# Patient Record
Sex: FEMALE | Race: BLACK OR AFRICAN AMERICAN | URBAN - METROPOLITAN AREA
[De-identification: names, ages, dates, MRNs, and addresses within clinical notes are randomized per-mention and may not be internally consistent; named-entity substitution may affect disease eponyms.]

---

## 2017-06-02 ENCOUNTER — OFFICE VISIT (OUTPATIENT)
Dept: ENDOCRINOLOGY | Age: 57
End: 2017-06-02

## 2017-06-02 VITALS
WEIGHT: 271.1 LBS | OXYGEN SATURATION: 100 % | TEMPERATURE: 98.2 F | DIASTOLIC BLOOD PRESSURE: 76 MMHG | BODY MASS INDEX: 48.04 KG/M2 | SYSTOLIC BLOOD PRESSURE: 148 MMHG | RESPIRATION RATE: 20 BRPM | HEART RATE: 107 BPM | HEIGHT: 63 IN

## 2017-06-02 DIAGNOSIS — Z79.4 UNCONTROLLED TYPE 2 DIABETES MELLITUS WITH HYPERGLYCEMIA, WITH LONG-TERM CURRENT USE OF INSULIN (HCC): ICD-10-CM

## 2017-06-02 DIAGNOSIS — E11.65 UNCONTROLLED TYPE 2 DIABETES MELLITUS WITH HYPERGLYCEMIA, WITH LONG-TERM CURRENT USE OF INSULIN (HCC): ICD-10-CM

## 2017-06-02 DIAGNOSIS — Z79.4 TYPE 2 DIABETES MELLITUS WITH HYPERGLYCEMIA, WITH LONG-TERM CURRENT USE OF INSULIN (HCC): Primary | ICD-10-CM

## 2017-06-02 DIAGNOSIS — I10 ESSENTIAL HYPERTENSION WITH GOAL BLOOD PRESSURE LESS THAN 140/90: ICD-10-CM

## 2017-06-02 DIAGNOSIS — E78.2 MIXED HYPERLIPIDEMIA: ICD-10-CM

## 2017-06-02 DIAGNOSIS — E11.65 TYPE 2 DIABETES MELLITUS WITH HYPERGLYCEMIA, WITH LONG-TERM CURRENT USE OF INSULIN (HCC): Primary | ICD-10-CM

## 2017-06-02 LAB
GLUCOSE POC: 147 MG/DL
HBA1C MFR BLD HPLC: 9.7 %

## 2017-06-02 RX ORDER — INSULIN GLARGINE 100 [IU]/ML
INJECTION, SOLUTION SUBCUTANEOUS
Qty: 30 ML | Refills: 5 | Status: SHIPPED | OUTPATIENT
Start: 2017-06-02 | End: 2017-07-19 | Stop reason: SDUPTHER

## 2017-06-02 RX ORDER — METFORMIN HYDROCHLORIDE 1000 MG/1
1000 TABLET ORAL 2 TIMES DAILY WITH MEALS
Qty: 60 TAB | Refills: 11 | Status: SHIPPED | OUTPATIENT
Start: 2017-06-02

## 2017-06-02 RX ORDER — PEN NEEDLE, DIABETIC 31 GX3/16"
NEEDLE, DISPOSABLE MISCELLANEOUS
Qty: 100 PEN NEEDLE | Refills: 11 | Status: SHIPPED | OUTPATIENT
Start: 2017-06-02 | End: 2018-10-11 | Stop reason: SDUPTHER

## 2017-06-02 NOTE — PATIENT INSTRUCTIONS
Basaglar 75 units at bedtime    Metformin 1000 mg twice day     Trulicity 1.5 mg weekly         Blood sugar  Breakfast/Lunch/Dinner         150-200  Add  4  Units       201-250  Add  8 Units       251-300  Add  12 Units       301-350  Add 16  Units        351-400  Add 20  Units

## 2017-06-02 NOTE — PROGRESS NOTES
[facility] ENDOCRINOLOGY               Shana Yousif MD        [address] [address] FO:[phone] Fax [phone]               Patient Information  Date:6/2/2017  Name : Stephanie Monsivais 49 y.o.     YOB: 1960                 Chief Complaint   Patient presents with    Diabetes       History of Present Illness: Stephanie Monsivais is a 49 y.o. female here for followupof  Type 2 Diabetes Mellitus. Type 2 Diabetes was diagnosed 24 years  . End organ effects of diabetes: peripheral neuropathy. 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 2 months             Wt Readings from Last 3 Encounters:   06/02/17 271 lb 1.6 oz (123 kg)   09/26/16 262 lb 12.8 oz (119.2 kg)       BP Readings from Last 3 Encounters:   06/02/17 148/76   09/26/16 140/73           Past Medical History:   Diagnosis Date    Diabetes mellitus, type II ([facility] [address] 1993    Hyperlipidemia     Hypertension      Current Outpatient Prescriptions   Medication Sig    atorvastatin (LIPITOR) 20 mg tablet TAKE 1 TABLET BY MOUTH AT BEDTIME    chlorthalidone (HYGROTEN) 25 mg tablet TAKE 1 TABLET BY MOUTH EVERY DAY    diltiazem CD (CARDIZEM CD) 300 mg ER capsule TAKE 1 CAP BY MOUTH ONCE A DAY.  fenofibrate nanocrystallized (TRICOR) 145 mg tablet TAKE 1 TABET BY MOUTH EVERY DAY    NOVOLOG 100 unit/mL injection INJECT 8 UNITS BENEATH THE SKIN 3 TIMES DAILY BEFORE MEALS.  LANTUS 100 unit/mL injection INJECT 60 UNITS SUBCUTANEOUSLY AT BEDTIME    losartan (COZAAR) 100 mg tablet TAKE 1 TABLET BY MOUTH EVERY DAY    ASPIRIN/SALICYLAMIDE/CAFFEINE (BC HEADACHE POWDER PO) Take  by mouth.  dulaglutide (TRULICITY) 5.58 SS/7.0 mL sub-q pen 0.5 mL by SubCUTAneous route every seven (7) days.      No current facility-administered medications for this visit. Allergies   Allergen Reactions    Lisinopril Cough     \"lungs shut down\"         Review of Systems:  -   - Gastrointestinal: no dysphagia no  abdominal pain  - Musculoskeletal: no joint pains +  weakness  - Integumentary: no rashes  - Neurological: + numbness, tingling, no  headaches  - Psychiatric: no depression no  anxiety  - Endocrine: no heat or cold intolerance    Physical Examination:   Blood pressure 148/76, pulse (!) 107, temperature 98.2 °F (36.8 °C), temperature source Oral, resp. rate 20, height 5' 3\" (1.6 m), weight 271 lb 1.6 oz (123 kg), SpO2 100 %. Estimated body mass index is 48.02 kg/(m^2) as calculated from the following:    Height as of this encounter: 5' 3\" (1.6 m). -   Weight as of this encounter: 271 lb 1.6 oz (123 kg). - General: pleasant, no distress, good eye contact  - HEENT: no pallor, no periorbital edema, EOMI  - Neck: supple, no thyromegaly, no nodules  - Cardiovascular: regular, normal rate, normal S1 and S2, no murmurs  - Respiratory: clear to auscultation bilaterally  - Gastrointestinal: soft, nontender, nondistended,  BS +  - Musculoskeletal: no proximal muscle weakness in upper or lower extremities  - Integumentary: no acanthosis nigricans,no edema,   - Neurological: alert and oriented  - Psychiatric: normal mood and affect  - Skin: color, texture, turgor normal.       Data Reviewed:     [] Glucose records reviewed. [] See glucose records for details (to be scanned). [] A1C  [] Reviewed labs    no labs    Assessment/Plan:     1. Type 2 diabetes mellitus with hyperglycemia, with long-term current use of insulin ([address]    2. Essential hypertension with goal blood pressure less than 140/90    3. Mixed hyperlipidemia        1.  Type 2 Diabetes Mellitus with neuropathy,  Lab Results   Component Value Date/Time    Hemoglobin A1c (POC) 9.7 06/02/2017 37:42 AM     Trulicity 1.5 mg weekly   Continue lantus   Metformin   Advised to check glucose 2 - 4 times daily  [place] [employer] patient's can use the savings card, will discuss with pharmacy . 2. HTN : Continue current therapy     3. Hyperlipidemia : Continue statin. 4.Obesity:Body mass index is 48.02 kg/(m^2). Discussed about the importance of exercise and carbohydrate portion control. There are no Patient Instructions on file for this visit. Follow-up Disposition: Not on File    Thank you for allowing me to participate in the care of this patient.     Jenny Aguirre MD      Patient verbalized understanding

## 2017-06-02 NOTE — PROGRESS NOTES
Chris Kumar is a 49 y.o. female here for   Chief Complaint   Patient presents with    Diabetes       Functional glucose monitor and record keeping system? - not regularly  Eye exam within last year? - yes, June 2017  Foot exam within last year? - yes, 5-6 mo ago    Lab Results   Component Value Date/Time    Hemoglobin A1c (POC) 10.1 09/26/2016 10:01 AM       Wt Readings from Last 3 Encounters:   09/26/16 262 lb 12.8 oz (119.2 kg)     Temp Readings from Last 3 Encounters:   09/26/16 96.3 °F (35.7 °C) (Oral)     BP Readings from Last 3 Encounters:   09/26/16 140/73     Pulse Readings from Last 3 Encounters:   09/26/16 90

## 2017-06-02 NOTE — MR AVS SNAPSHOT
Visit Information Date & Time Provider Department Dept. Phone Encounter #  
 6/2/2017 10:30 AM Yelitza Mayo MD [facility] [phone] [id number] Follow-up Instructions Return in about 3 months (around 9/2/2017). Upcoming Health Maintenance Date Due Hepatitis C Screening 1960 LIPID PANEL Q1 1960 FOOT EXAM Q1 7/10/1972 EYE EXAM RETINAL OR DILATED Q1 7/10/1972 Pneumococcal 19-64 Medium Risk (1 of 1 - PPSV23) 7/10/1981 DTaP/Tdap/Td series (1 - Tdap) 7/10/1983 PAP AKA CERVICAL CYTOLOGY 7/10/1983 BREAST CANCER SCRN MAMMOGRAM 7/10/2012 FOBT Q 1 YEAR AGE 50-75 7/10/2012 HEMOGLOBIN A1C Q6M 3/26/2017 INFLUENZA AGE 9 TO ADULT 8/1/2017 MICROALBUMIN Q1 9/26/2017 Allergies as of 6/2/2017  Review Complete On: 6/2/2017 By: Yelitza Mayo MD  
  
 Severity Noted Reaction Type Reactions Lisinopril  09/26/2016    Cough \"lungs shut down\" Current Immunizations  Never Reviewed No immunizations on file. Not reviewed this visit You Were Diagnosed With   
  
 Codes Comments Type 2 diabetes mellitus with hyperglycemia, with long-term current use of insulin (HCC)    -  Primary ICD-10-CM: E11.65, Z79.4 ICD-9-CM: 250.00, 790.29, V58.67 Essential hypertension with goal blood pressure less than 140/90     ICD-10-CM: I10 
ICD-9-CM: 401.9 Mixed hyperlipidemia     ICD-10-CM: E78.2 ICD-9-CM: 272.2 Vitals BP Pulse Temp Resp Height(growth percentile) Weight(growth percentile) 148/76 (BP 1 Location: Left arm, BP Patient Position: Sitting) (!) 107 98.2 °F (36.8 °C) (Oral) 20 5' 3\" (1.6 m) 271 lb 1.6 oz (123 kg) SpO2 BMI OB Status Smoking Status 100% 48.02 kg/m2 Menopause Former Smoker Vitals History BMI and BSA Data Body Mass Index Body Surface Area 48.02 kg/m 2 2.34 m 2 Preferred Pharmacy Pharmacy Name Phone [id number] - [place] [address] [place] [address] [phone] Your Updated Medication List  
  
   
This list is accurate as of: 6/2/17 11:03 AM.  Always use your most recent med list.  
  
  
  
  
 atorvastatin 20 mg tablet Commonly known as:  LIPITOR  
TAKE 1 TABLET BY MOUTH AT BEDTIME  
  
 BC HEADACHE POWDER PO Take  by mouth. chlorthalidone 25 mg tablet Commonly known as:  HYGROTEN  
TAKE 1 TABLET BY MOUTH EVERY DAY  
  
 dilTIAZem  mg ER capsule Commonly known as:  CARDIZEM CD  
TAKE 1 CAP BY MOUTH ONCE A DAY. dulaglutide 0.75 mg/0.5 mL sub-q pen Commonly known as:  TRULICITY  
0.5 mL by SubCUTAneous route every seven (7) days. fenofibrate nanocrystallized 145 mg tablet Commonly known as:  TRICOR  
TAKE 1 TABET BY MOUTH EVERY DAY  
  
 LANTUS 100 unit/mL injection Generic drug:  insulin glargine INJECT 60 UNITS SUBCUTANEOUSLY AT BEDTIME  
  
 losartan 100 mg tablet Commonly known as:  COZAAR  
TAKE 1 TABLET BY MOUTH EVERY DAY NovoLOG 100 unit/mL injection Generic drug:  insulin aspart INJECT 8 UNITS BENEATH THE SKIN 3 TIMES DAILY BEFORE MEALS. We Performed the Following AMB POC GLUCOSE, QUANTITATIVE, BLOOD [32112 CPT(R)] AMB POC HEMOGLOBIN A1C [18302 CPT(R)] Follow-up Instructions Return in about 3 months (around 9/2/2017). Patient Instructions Basaglar 75 units at bedtime Metformin 1000 mg twice day Trulicity 1.5 mg weekly Blood sugar  Breakfast/Lunch/Dinner 150-200  Add  4  Units 201-250  Add  8 Units 251-300  Add  12 Units 301-350  Add 16  Units 351-400  Add 20  Units Introducing [facility] & HEALTH SERVICES! Mindy Doe introduces [employer] patient portal. Now you can access parts of your medical record, email your doctor's office, and request medication refills online. 1. In your internet browser, go to [email or web address] 2. Click on the First Time User? Click Here link in the Sign In box. You will see the New Member Sign Up page. 3. Enter your [employer] Access Code exactly as it appears below. You will not need to use this code after youve completed the sign-up process. If you do not sign up before the expiration date, you must request a new code. · [employer] Access Code: [email or web address] Expires: 8/31/2017 11:03 AM 
 
4. Enter the last four digits of your Social Security Number (xxxx) and Date of Birth (mm/dd/yyyy) as indicated and click Submit. You will be taken to the next sign-up page. 5. Create a [employer] ID. This will be your [employer] login ID and cannot be changed, so think of one that is secure and easy to remember. 6. Create a [employer] password. You can change your password at any time. 7. Enter your Password Reset Question and Answer. This can be used at a later time if you forget your password. 8. Enter your e-mail address. You will receive e-mail notification when new information is available in [address] 9. Click Sign Up. You can now view and download portions of your medical record. 10. Click the Download Summary menu link to download a portable copy of your medical information. If you have questions, please visit the Frequently Asked Questions section of the [employer] website. Remember, [employer] is NOT to be used for urgent needs. For medical emergencies, dial 911. Now available from your iPhone and Android! Please provide this summary of care documentation to your next provider. If you have any questions after today's visit, please call [phone].

## 2017-07-19 DIAGNOSIS — E11.65 UNCONTROLLED TYPE 2 DIABETES MELLITUS WITH HYPERGLYCEMIA, WITH LONG-TERM CURRENT USE OF INSULIN (HCC): ICD-10-CM

## 2017-07-19 DIAGNOSIS — Z79.4 TYPE 2 DIABETES MELLITUS WITH HYPERGLYCEMIA, WITH LONG-TERM CURRENT USE OF INSULIN (HCC): Primary | ICD-10-CM

## 2017-07-19 DIAGNOSIS — Z79.4 UNCONTROLLED TYPE 2 DIABETES MELLITUS WITH HYPERGLYCEMIA, WITH LONG-TERM CURRENT USE OF INSULIN (HCC): ICD-10-CM

## 2017-07-19 DIAGNOSIS — E11.65 TYPE 2 DIABETES MELLITUS WITH HYPERGLYCEMIA, WITH LONG-TERM CURRENT USE OF INSULIN (HCC): Primary | ICD-10-CM

## 2017-07-19 RX ORDER — INSULIN ASPART 100 [IU]/ML
INJECTION, SOLUTION INTRAVENOUS; SUBCUTANEOUS
Qty: 20 ML | Refills: 11 | Status: SHIPPED | OUTPATIENT
Start: 2017-07-19 | End: 2018-03-12 | Stop reason: SDUPTHER

## 2017-07-19 RX ORDER — INSULIN GLARGINE 100 [IU]/ML
INJECTION, SOLUTION SUBCUTANEOUS
Qty: 30 ML | Refills: 11 | Status: SHIPPED | OUTPATIENT
Start: 2017-07-19 | End: 2018-11-16 | Stop reason: SDUPTHER

## 2018-03-12 ENCOUNTER — OFFICE VISIT (OUTPATIENT)
Dept: ENDOCRINOLOGY | Age: 58
End: 2018-03-12

## 2018-03-12 VITALS
TEMPERATURE: 98 F | HEART RATE: 101 BPM | WEIGHT: 268 LBS | RESPIRATION RATE: 18 BRPM | BODY MASS INDEX: 47.48 KG/M2 | HEIGHT: 63 IN | OXYGEN SATURATION: 97 % | DIASTOLIC BLOOD PRESSURE: 71 MMHG | SYSTOLIC BLOOD PRESSURE: 144 MMHG

## 2018-03-12 DIAGNOSIS — E78.2 MIXED HYPERLIPIDEMIA: ICD-10-CM

## 2018-03-12 DIAGNOSIS — Z79.4 TYPE 2 DIABETES MELLITUS WITH HYPERGLYCEMIA, WITH LONG-TERM CURRENT USE OF INSULIN (HCC): ICD-10-CM

## 2018-03-12 DIAGNOSIS — Z79.4 DIABETES MELLITUS DUE TO UNDERLYING CONDITION WITH DIABETIC NEUROPATHY, WITH LONG-TERM CURRENT USE OF INSULIN (HCC): ICD-10-CM

## 2018-03-12 DIAGNOSIS — E08.40 DIABETES MELLITUS DUE TO UNDERLYING CONDITION WITH DIABETIC NEUROPATHY, WITH LONG-TERM CURRENT USE OF INSULIN (HCC): ICD-10-CM

## 2018-03-12 DIAGNOSIS — E11.65 TYPE 2 DIABETES MELLITUS WITH HYPERGLYCEMIA, WITH LONG-TERM CURRENT USE OF INSULIN (HCC): ICD-10-CM

## 2018-03-12 DIAGNOSIS — E66.01 OBESITY, MORBID (HCC): ICD-10-CM

## 2018-03-12 DIAGNOSIS — Z79.4 TYPE 2 DIABETES MELLITUS WITH HYPERGLYCEMIA, WITH LONG-TERM CURRENT USE OF INSULIN (HCC): Primary | ICD-10-CM

## 2018-03-12 DIAGNOSIS — Z79.4 UNCONTROLLED TYPE 2 DIABETES MELLITUS WITH HYPERGLYCEMIA, WITH LONG-TERM CURRENT USE OF INSULIN (HCC): ICD-10-CM

## 2018-03-12 DIAGNOSIS — I10 ESSENTIAL HYPERTENSION WITH GOAL BLOOD PRESSURE LESS THAN 140/90: ICD-10-CM

## 2018-03-12 DIAGNOSIS — E11.65 UNCONTROLLED TYPE 2 DIABETES MELLITUS WITH HYPERGLYCEMIA, WITH LONG-TERM CURRENT USE OF INSULIN (HCC): ICD-10-CM

## 2018-03-12 DIAGNOSIS — E11.65 TYPE 2 DIABETES MELLITUS WITH HYPERGLYCEMIA, WITH LONG-TERM CURRENT USE OF INSULIN (HCC): Primary | ICD-10-CM

## 2018-03-12 LAB
GLUCOSE POC: 98 MG/DL
HBA1C MFR BLD HPLC: 10.4 %

## 2018-03-12 RX ORDER — LANCETS
EACH MISCELLANEOUS
Qty: 100 EACH | Refills: 11 | Status: SHIPPED | OUTPATIENT
Start: 2018-03-12 | End: 2021-03-10 | Stop reason: SDUPTHER

## 2018-03-12 RX ORDER — INSULIN ASPART 100 [IU]/ML
INJECTION, SOLUTION INTRAVENOUS; SUBCUTANEOUS
Qty: 30 ML | Refills: 11 | Status: SHIPPED | OUTPATIENT
Start: 2018-03-12 | End: 2019-04-04 | Stop reason: SDUPTHER

## 2018-03-12 RX ORDER — BLOOD-GLUCOSE METER
EACH MISCELLANEOUS
Qty: 1 EACH | Refills: 0 | Status: SHIPPED | OUTPATIENT
Start: 2018-03-12 | End: 2018-04-11 | Stop reason: SDUPTHER

## 2018-03-12 NOTE — MR AVS SNAPSHOT
[address] [facility] [address] 
[phone] Patient: Narinder Side MRN: [id number] K:4[birth year]49/8551 Visit Information Date & Time Provider Department Dept. Phone Encounter #  
 3/12/2018 10:30 AM Suzy Tinoco MD [facility] [phone] [id number] Follow-up Instructions Return in about 4 months (around 7/12/2018). Upcoming Health Maintenance Date Due Hepatitis C Screening 1960 LIPID PANEL Q1 1960 FOOT EXAM Q1 7/10/1972 EYE EXAM RETINAL OR DILATED Q1 7/10/1972 Pneumococcal 19-64 Medium Risk (1 of 1 - PPSV23) 7/10/1981 DTaP/Tdap/Td series (1 - Tdap) 7/10/1983 PAP AKA CERVICAL CYTOLOGY 7/10/1983 BREAST CANCER SCRN MAMMOGRAM 7/10/2012 FOBT Q 1 YEAR AGE 50-75 7/10/2012 Influenza Age 5 to Adult 8/1/2017 MICROALBUMIN Q1 9/26/2017 HEMOGLOBIN A1C Q6M 12/2/2017 Allergies as of 3/12/2018  Review Complete On: 3/12/2018 By: Suzy Tinoco MD  
  
 Severity Noted Reaction Type Reactions Lisinopril  09/26/2016    Cough \"lungs shut down\" Current Immunizations  Never Reviewed No immunizations on file. Not reviewed this visit You Were Diagnosed With   
  
 Codes Comments Type 2 diabetes mellitus with hyperglycemia, with long-term current use of insulin (HCC)    -  Primary ICD-10-CM: E11.65, Z79.4 ICD-9-CM: 250.00, 790.29, V58.67 Essential hypertension with goal blood pressure less than 140/90     ICD-10-CM: I10 
ICD-9-CM: 401.9 Mixed hyperlipidemia     ICD-10-CM: E78.2 ICD-9-CM: 272.2 Vitals BP Pulse Temp Resp Height(growth percentile) Weight(growth percentile) 144/71 (BP 1 Location: Right arm, BP Patient Position: Sitting) (!) 101 98 °F (36.7 °C) (Oral) 18 5' 3\" (1.6 m) 268 lb (121.6 kg) SpO2 BMI OB Status Smoking Status 97% 47.47 kg/m2 Menopause Former Smoker Vitals History BMI and BSA Data Body Mass Index Body Surface Area  
 47.47 kg/m 2 2.32 m 2 Preferred Pharmacy Pharmacy Name Phone [facility][id number] [facility] [place] - [address] [place] [address] [phone] Your Updated Medication List  
  
   
This list is accurate as of 3/12/18 11:08 AM.  Always use your most recent med list.  
  
  
  
  
 atorvastatin 20 mg tablet Commonly known as:  LIPITOR  
TAKE 1 TABLET BY MOUTH AT BEDTIME  
  
 BC HEADACHE POWDER PO Take  by mouth. chlorthalidone 25 mg tablet Commonly known as:  HYGROTEN  
TAKE 1 TABLET BY MOUTH EVERY DAY  
  
 dilTIAZem  mg ER capsule Commonly known as:  CARDIZEM CD  
TAKE 1 CAP BY MOUTH ONCE A DAY. dulaglutide 1.5 mg/0.5 mL sub-q pen Commonly known as:  TRULICITY  
0.5 mL by SubCUTAneous route every seven (7) days. fenofibrate nanocrystallized 145 mg tablet Commonly known as:  TRICOR  
TAKE 1 TABET BY MOUTH EVERY DAY  
  
 insulin glargine 100 unit/mL (3 mL) Inpn Commonly known as:  BASAGLAR KWIKPEN U-100 INSULIN Inject 75 units at bedtime Insulin Needles (Disposable) 32 gauge x 5/32\" Ndle Commonly known as:  Betzaida Pen Needle Use to inject Basaglar daily Dx Code: E11.65  
  
 losartan 100 mg tablet Commonly known as:  COZAAR  
TAKE 1 TABLET BY MOUTH EVERY DAY  
  
 metFORMIN 1,000 mg tablet Commonly known as:  GLUCOPHAGE Take 1 Tab by mouth two (2) times daily (with meals). NovoLOG U-100 Insulin aspart 100 unit/mL injection Generic drug:  insulin aspart U-100 Use with SSI. Max units daily: 60 We Performed the Following AMB POC GLUCOSE, QUANTITATIVE, BLOOD [74240 CPT(R)] AMB POC HEMOGLOBIN A1C [09913 CPT(R)] LDL, DIRECT [id number] CPT(R)] METABOLIC PANEL, COMPREHENSIVE [76484 CPT(R)] MICROALBUMIN, UR, RAND W/ MICROALB/CREAT RATIO [id number] CPT(R)] Follow-up Instructions Return in about 4 months (around 7/12/2018). Patient Instructions Basaglar 75 units at bedtime Metformin 1000 mg twice day Trulicity 1.5 mg weekly If you can not get Trulicity then you need Novolog 18 units before each meal  
 
Additional Novolog for high sugars Blood sugar  Breakfast/Lunch/Dinner 150-200  Add  4  Units 201-250  Add  8 Units 251-300  Add  12 Units 301-350  Add 16  Units 351-400  Add 20  Units Introducing [facility]! [employer] introduces [employer] patient portal. Now you can access parts of your medical record, email your doctor's office, and request medication refills online. 1. In your internet browser, go to [email or web address]. [employer]oon/[employer] 2. Click on the First Time User? Click Here link in the Sign In box. You will see the New Member Sign Up page. 3. Enter your [employer] Access Code exactly as it appears below. You will not need to use this code after youve completed the sign-up process. If you do not sign up before the expiration date, you must request a new code. · [employer] Access Code: [email or web address] Expires: 6/10/2018 11:03 AM 
 
4. Enter the last four digits of your Social Security Number (xxxx) and Date of Birth (mm/dd/yyyy) as indicated and click Submit. You will be taken to the next sign-up page. 5. Create a [employer] ID. This will be your [employer] login ID and cannot be changed, so think of one that is secure and easy to remember. 6. Create a [employer] password. You can change your password at any time. 7. Enter your Password Reset Question and Answer. This can be used at a later time if you forget your password. 8. Enter your e-mail address. You will receive e-mail notification when new information is available in [address] 9. Click Sign Up. You can now view and download portions of your medical record. 10. Click the Download Summary menu link to download a portable copy of your medical information. If you have questions, please visit the Frequently Asked Questions section of the [employer] website. Remember, [employer] is NOT to be used for urgent needs. For medical emergencies, dial 911. Now available from your iPhone and Android! Please provide this summary of care documentation to your next provider. If you have any questions after today's visit, please call [phone].

## 2018-03-12 NOTE — PROGRESS NOTES
[facility]               Deven Lyon MD        [address] 78 [phone] Fax [phone]               Patient Information  Date:3/12/2018  Name : Oly Harvey 56 y.o.     YOB: 1960                 Chief Complaint   Patient presents with    Diabetes       History of Present Illness: Oly Harvey is a 56 y.o. female here for followupof  Type 2 Diabetes Mellitus. Type 2 Diabetes was diagnosed 24 years  . End organ effects of diabetes: peripheral neuropathy. 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 A1c is elevated  No chest pain or shortness of breath. Trulicity helped her and wishes to go back on it          Wt Readings from Last 3 Encounters:   03/12/18 268 lb (121.6 kg)   06/02/17 271 lb 1.6 oz (123 kg)   09/26/16 262 lb 12.8 oz (119.2 kg)       BP Readings from Last 3 Encounters:   03/12/18 144/71   06/02/17 148/76   09/26/16 140/73           Past Medical History:   Diagnosis Date    Diabetes mellitus, type II ([facility] [address] 1993    Hyperlipidemia     Hypertension      Current Outpatient Prescriptions   Medication Sig    insulin glargine (BASAGLAR KWIKPEN) 100 unit/mL (3 mL) inpn Inject 75 units at bedtime    metFORMIN (GLUCOPHAGE) 1,000 mg tablet Take 1 Tab by mouth two (2) times daily (with meals).  Insulin Needles, Disposable, (ITZEL PEN NEEDLE) 32 gauge x 5/32\" ndle Use to inject Basaglar daily Dx Code: E11.65    atorvastatin (LIPITOR) 20 mg tablet TAKE 1 TABLET BY MOUTH AT BEDTIME    chlorthalidone (HYGROTEN) 25 mg tablet TAKE 1 TABLET BY MOUTH EVERY DAY    diltiazem CD (CARDIZEM CD) 300 mg ER capsule TAKE 1 CAP BY MOUTH ONCE A DAY.     fenofibrate nanocrystallized (TRICOR) 145 mg tablet TAKE 1 TABET BY MOUTH EVERY DAY    losartan (COZAAR) 100 mg tablet TAKE 1 TABLET BY MOUTH EVERY DAY    ASPIRIN/SALICYLAMIDE/CAFFEINE (BC HEADACHE POWDER PO) Take  by mouth.  NOVOLOG U-100 INSULIN ASPART 100 unit/mL injection 18 units before each meal w/ SSI Max units daily: 60    dulaglutide (TRULICITY) 1.5 FJ/7.6 mL sub-q pen 0.5 mL by SubCUTAneous route every seven (7) days.  Blood-Glucose Meter (ACCU-CHEK CARLOS PLUS METER) misc Test TID Dx Code: E11.65    glucose blood VI test strips (ACCU-CHEK CARLOS PLUS TEST STRP) strip Test TID Dx Code: E11.65    Lancets (ACCU-CHEK FASTCLIX) misc Test TID Dx Code: E11.65     No current facility-administered medications for this visit. Allergies   Allergen Reactions    Lisinopril Cough     \"lungs shut down\"         Review of Systems:  -   - Gastrointestinal: no dysphagia no  abdominal pain  - Musculoskeletal: no joint pains +  weakness  - Integumentary: no rashes  - Neurological: + numbness, tingling, no  headaches  - Psychiatric: no depression no  anxiety  - Endocrine: no heat or cold intolerance    Physical Examination:   Blood pressure 144/71, pulse (!) 101, temperature 98 °F (36.7 °C), temperature source Oral, resp. rate 18, height 5' 3\" (1.6 m), weight 268 lb (121.6 kg), SpO2 97 %. Estimated body mass index is 47.47 kg/(m^2) as calculated from the following:    Height as of this encounter: 5' 3\" (1.6 m). -   Weight as of this encounter: 268 lb (121.6 kg).   - General: pleasant, no distress, good eye contact  - HEENT: no pallor, no periorbital edema, EOMI  - Neck: supple, no thyromegaly, no nodules  - Cardiovascular: regular, normal rate, normal S1 and S2, no murmurs  - Respiratory: clear to auscultation bilaterally  - Gastrointestinal: soft, nontender, nondistended,  BS +  - Musculoskeletal: no proximal muscle weakness in upper or lower extremities  - Integumentary: no edema,   - Neurological: alert and oriented  - Psychiatric: normal mood and affect  - Skin: color, texture, turgor normal.     Diabetic foot exam: March 2018    Left:     Vibratory sensation absent   Filament test decreased sensation with micro filament   Pulse DP: 1+    Deformities: None  Right:    Vibratory sensation absent   Filament test decreased sensation with micro filament   Pulse DP: 1+   Deformities: None    Data Reviewed:     [] Glucose records reviewed. [] See glucose records for details (to be scanned). [] A1C  [] Reviewed labs    no labs    Assessment/Plan:     1. Type 2 diabetes mellitus with hyperglycemia, with long-term current use of insulin ([address]    2. Essential hypertension with goal blood pressure less than 140/90    3. Mixed hyperlipidemia    4. Obesity, morbid ([address]    5. Diabetes mellitus due to underlying condition with diabetic neuropathy, with long-term current use of insulin ([address]        1. Type 2 Diabetes Mellitus with neuropathy,  Lab Results   Component Value Date/Time    Hemoglobin A1c (POC) 10.4 03/12/2018 10:43 AM   Uncontrolled, counseled about the diet  Trulicity 1.5 mg weekly -if she cannot afford Trulicity discussed about prandial insulin  Continue lantus   Metformin   Advised to check glucose 2 - 4 times daily  [employer] patient's can use the [employer] card, will discuss with pharmacy . 2. HTN : Continue current therapy     3. Hyperlipidemia : Continue statin. 4.Obesity:Body mass index is 47.47 kg/(m^2). Discussed about the importance of exercise and carbohydrate portion control. Patient Instructions   Basaglar 75 units at bedtime    Metformin 1000 mg twice day     Trulicity 1.5 mg weekly     If you can not get Trulicity then you need Novolog 18 units before each meal     Additional Novolog for high sugars     Blood sugar  Breakfast/Lunch/Dinner         150-200  Add  4  Units       201-250  Add  8 Units       251-300  Add  12 Units       301-350  Add 16  Units        351-400  Add 20  Units     Follow-up Disposition:  Return in about 4 months (around 7/12/2018). Thank you for allowing me to participate in the care of this patient.     Martha Garcia Kim Bhagat MD      Patient verbalized understanding

## 2018-03-12 NOTE — PATIENT INSTRUCTIONS
Basaglar 75 units at bedtime    Metformin 1000 mg twice day     Trulicity 1.5 mg weekly     If you can not get Trulicity then you need Novolog 18 units before each meal     Additional Novolog for high sugars     Blood sugar  Breakfast/Lunch/Dinner         150-200  Add  4  Units       201-250  Add  8 Units       251-300  Add  12 Units       301-350  Add 16  Units        351-400  Add 20  Units

## 2018-03-12 NOTE — PROGRESS NOTES
Nicole Justice is a 56 y.o. female here for   Chief Complaint   Patient presents with    Diabetes       Functional glucose monitor and record keeping system? - yes  Eye exam within last year? - Jan 2018 [address] exam within last year? - due    1. Have you been to the ER, urgent care clinic since your last visit? Hospitalized since your last visit? -no    2. Have you seen or consulted any other health care providers outside of the [address] [place] [address] since your last visit? Include any pap smears or colon screening. -PCP      Lab Results   Component Value Date/Time    Hemoglobin A1c (POC) 9.7 06/02/2017 10:29 AM       Wt Readings from Last 3 Encounters:   06/02/17 271 lb 1.6 oz (123 kg)   09/26/16 262 lb 12.8 oz (119.2 kg)     Temp Readings from Last 3 Encounters:   06/02/17 98.2 °F (36.8 °C) (Oral)   09/26/16 96.3 °F (35.7 °C) (Oral)     BP Readings from Last 3 Encounters:   06/02/17 148/76   09/26/16 140/73     Pulse Readings from Last 3 Encounters:   06/02/17 (!) 107   09/26/16 90

## 2018-03-13 LAB
ALBUMIN SERPL-MCNC: 3.9 G/DL (ref 3.5–5.5)
ALBUMIN/CREAT UR: 2743.8 MG/G CREAT (ref 0–30)
ALBUMIN/GLOB SERPL: 1.3 {RATIO} (ref 1.2–2.2)
ALP SERPL-CCNC: 131 IU/L (ref 39–117)
ALT SERPL-CCNC: 17 IU/L (ref 0–32)
AST SERPL-CCNC: 18 IU/L (ref 0–40)
BILIRUB SERPL-MCNC: <0.2 MG/DL (ref 0–1.2)
BUN SERPL-MCNC: 30 MG/DL (ref 6–24)
BUN/CREAT SERPL: 24 (ref 9–23)
CALCIUM SERPL-MCNC: 9.4 MG/DL (ref 8.7–10.2)
CHLORIDE SERPL-SCNC: 102 MMOL/L (ref 96–106)
CO2 SERPL-SCNC: 22 MMOL/L (ref 18–29)
CREAT SERPL-MCNC: 1.23 MG/DL (ref 0.57–1)
CREAT UR-MCNC: 78 MG/DL
GFR SERPLBLD CREATININE-BSD FMLA CKD-EPI: 49 ML/MIN/1.73
GFR SERPLBLD CREATININE-BSD FMLA CKD-EPI: 57 ML/MIN/1.73
GLOBULIN SER CALC-MCNC: 3.1 G/DL (ref 1.5–4.5)
GLUCOSE SERPL-MCNC: 92 MG/DL (ref 65–99)
INTERPRETATION: NORMAL
LDLC SERPL DIRECT ASSAY-MCNC: 162 MG/DL (ref 0–99)
Lab: NORMAL
MICROALBUMIN UR-MCNC: 2140.2 UG/ML
POTASSIUM SERPL-SCNC: 4.2 MMOL/L (ref 3.5–5.2)
PROT SERPL-MCNC: 7 G/DL (ref 6–8.5)
SODIUM SERPL-SCNC: 142 MMOL/L (ref 134–144)

## 2018-04-11 DIAGNOSIS — E11.65 TYPE 2 DIABETES MELLITUS WITH HYPERGLYCEMIA, WITH LONG-TERM CURRENT USE OF INSULIN (HCC): ICD-10-CM

## 2018-04-11 DIAGNOSIS — Z79.4 TYPE 2 DIABETES MELLITUS WITH HYPERGLYCEMIA, WITH LONG-TERM CURRENT USE OF INSULIN (HCC): ICD-10-CM

## 2018-04-11 DIAGNOSIS — Z79.4 UNCONTROLLED TYPE 2 DIABETES MELLITUS WITH HYPERGLYCEMIA, WITH LONG-TERM CURRENT USE OF INSULIN (HCC): ICD-10-CM

## 2018-04-11 DIAGNOSIS — E11.65 UNCONTROLLED TYPE 2 DIABETES MELLITUS WITH HYPERGLYCEMIA, WITH LONG-TERM CURRENT USE OF INSULIN (HCC): ICD-10-CM

## 2018-04-11 RX ORDER — BLOOD SUGAR DIAGNOSTIC
STRIP MISCELLANEOUS
Qty: 100 STRIP | Refills: 0 | Status: SHIPPED | OUTPATIENT
Start: 2018-04-11 | End: 2018-04-19 | Stop reason: SDUPTHER

## 2018-04-19 DIAGNOSIS — Z79.4 TYPE 2 DIABETES MELLITUS WITH HYPERGLYCEMIA, WITH LONG-TERM CURRENT USE OF INSULIN (HCC): ICD-10-CM

## 2018-04-19 DIAGNOSIS — E11.65 UNCONTROLLED TYPE 2 DIABETES MELLITUS WITH HYPERGLYCEMIA, WITH LONG-TERM CURRENT USE OF INSULIN (HCC): ICD-10-CM

## 2018-04-19 DIAGNOSIS — E11.65 TYPE 2 DIABETES MELLITUS WITH HYPERGLYCEMIA, WITH LONG-TERM CURRENT USE OF INSULIN (HCC): ICD-10-CM

## 2018-04-19 DIAGNOSIS — Z79.4 UNCONTROLLED TYPE 2 DIABETES MELLITUS WITH HYPERGLYCEMIA, WITH LONG-TERM CURRENT USE OF INSULIN (HCC): ICD-10-CM

## 2018-07-30 ENCOUNTER — OFFICE VISIT (OUTPATIENT)
Dept: ENDOCRINOLOGY | Age: 58
End: 2018-07-30

## 2018-07-30 VITALS
WEIGHT: 264.4 LBS | SYSTOLIC BLOOD PRESSURE: 160 MMHG | HEART RATE: 100 BPM | BODY MASS INDEX: 46.85 KG/M2 | DIASTOLIC BLOOD PRESSURE: 98 MMHG | HEIGHT: 63 IN | RESPIRATION RATE: 16 BRPM | TEMPERATURE: 98.7 F | OXYGEN SATURATION: 98 %

## 2018-07-30 DIAGNOSIS — E11.65 TYPE 2 DIABETES MELLITUS WITH HYPERGLYCEMIA, WITH LONG-TERM CURRENT USE OF INSULIN (HCC): Primary | ICD-10-CM

## 2018-07-30 DIAGNOSIS — Z79.4 TYPE 2 DIABETES MELLITUS WITH HYPERGLYCEMIA, WITH LONG-TERM CURRENT USE OF INSULIN (HCC): Primary | ICD-10-CM

## 2018-07-30 DIAGNOSIS — E11.9 TYPE 2 DIABETES MELLITUS WITHOUT COMPLICATION, WITH LONG-TERM CURRENT USE OF INSULIN (HCC): Primary | ICD-10-CM

## 2018-07-30 DIAGNOSIS — E78.2 MIXED HYPERLIPIDEMIA: ICD-10-CM

## 2018-07-30 DIAGNOSIS — Z79.4 TYPE 2 DIABETES MELLITUS WITH HYPERGLYCEMIA, WITH LONG-TERM CURRENT USE OF INSULIN (HCC): ICD-10-CM

## 2018-07-30 DIAGNOSIS — Z79.4 UNCONTROLLED TYPE 2 DIABETES MELLITUS WITH HYPERGLYCEMIA, WITH LONG-TERM CURRENT USE OF INSULIN (HCC): ICD-10-CM

## 2018-07-30 DIAGNOSIS — E11.65 TYPE 2 DIABETES MELLITUS WITH HYPERGLYCEMIA, WITH LONG-TERM CURRENT USE OF INSULIN (HCC): ICD-10-CM

## 2018-07-30 DIAGNOSIS — Z79.4 TYPE 2 DIABETES MELLITUS WITHOUT COMPLICATION, WITH LONG-TERM CURRENT USE OF INSULIN (HCC): Primary | ICD-10-CM

## 2018-07-30 DIAGNOSIS — E11.65 UNCONTROLLED TYPE 2 DIABETES MELLITUS WITH HYPERGLYCEMIA, WITH LONG-TERM CURRENT USE OF INSULIN (HCC): ICD-10-CM

## 2018-07-30 DIAGNOSIS — I10 ESSENTIAL HYPERTENSION WITH GOAL BLOOD PRESSURE LESS THAN 140/90: ICD-10-CM

## 2018-07-30 PROBLEM — E11.21 TYPE 2 DIABETES WITH NEPHROPATHY (HCC): Status: ACTIVE | Noted: 2018-07-30

## 2018-07-30 LAB
GLUCOSE POC: 147 MG/DL
HBA1C MFR BLD HPLC: 8.4 %

## 2018-07-30 NOTE — PROGRESS NOTES
[facility]               Osvaldo Sapp MD        [address] [address] SY:[phone] Fax [phone]               Patient Information  Date:8/1/2018  Name : Carlton Victor 66 y.o.     YOB: 1960                 Chief Complaint   Patient presents with    Diabetes       History of Present Illness: Carlton Victor is a 66 y.o. female here for followupof  Type 2 Diabetes Mellitus. Type 2 Diabetes was diagnosed 24 years  . End organ effects of diabetes: peripheral neuropathy. 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 No chest pain or shortness of breath. Planning to resume exercise, and considering her diet plan          Wt Readings from Last 3 Encounters:   07/30/18 264 lb 6.4 oz (119.9 kg)   03/12/18 268 lb (121.6 kg)   06/02/17 271 lb 1.6 oz (123 kg)       BP Readings from Last 3 Encounters:   07/30/18 (!) 160/98   03/12/18 144/71   06/02/17 148/76           Past Medical History:   Diagnosis Date    Diabetes mellitus, type II ([address] 1993    Hyperlipidemia     Hypertension      Current Outpatient Prescriptions   Medication Sig    glucose blood VI test strips (ACCU-CHEK CARLOS PLUS TEST STRP) strip Test TID Dx Code: E11.65    NOVOLOG U-100 INSULIN ASPART 100 unit/mL injection 18 units before each meal w/ SSI Max units daily: 60    Lancets (ACCU-CHEK FASTCLIX) misc Test TID Dx Code: E11.65    insulin glargine (BASAGLAR KWIKPEN) 100 unit/mL (3 mL) inpn Inject 75 units at bedtime    metFORMIN (GLUCOPHAGE) 1,000 mg tablet Take 1 Tab by mouth two (2) times daily (with meals).     Insulin Needles, Disposable, (ITZEL PEN NEEDLE) 32 gauge x 5/32\" ndle Use to inject Basaglar daily Dx Code: E11.65    atorvastatin (LIPITOR) 20 mg tablet TAKE 1 TABLET BY MOUTH AT BEDTIME    chlorthalidone (HYGROTEN) 25 mg tablet TAKE 1 TABLET BY MOUTH EVERY DAY    diltiazem CD (CARDIZEM CD) 300 mg ER capsule TAKE 1 CAP BY MOUTH ONCE A DAY.  losartan (COZAAR) 100 mg tablet TAKE 1 TABLET BY MOUTH EVERY DAY    ASPIRIN/SALICYLAMIDE/CAFFEINE (BC HEADACHE POWDER PO) Take  by mouth.  semaglutide (OZEMPIC) 1 mg/0.75 mL (2 mg/1.5 mL) sub-q pen 1 mg by SubCUTAneous route every seven (7) days.  semaglutide (OZEMPIC) 0.25 mg/0.2 mL (2 mg/1.5 mL) sub-q pen 0.5 mg by SubCUTAneous route every seven (7) days.  fenofibrate nanocrystallized (TRICOR) 145 mg tablet TAKE 1 TABET BY MOUTH EVERY DAY     No current facility-administered medications for this visit. Allergies   Allergen Reactions    Lisinopril Cough     \"lungs shut down\"         Review of Systems:  -   - Gastrointestinal: no dysphagia no  abdominal pain  - Musculoskeletal: no joint pains +  weakness  - Integumentary: no rashes  - Neurological: + numbness, tingling, no  headaches  - Psychiatric: no depression no  anxiety  - Endocrine: no heat or cold intolerance    Physical Examination:   Blood pressure (!) 160/98, pulse 100, temperature 98.7 °F (37.1 °C), temperature source Oral, resp. rate 16, height 5' 3\" (1.6 m), weight 264 lb 6.4 oz (119.9 kg), SpO2 98 %. Estimated body mass index is 46.84 kg/(m^2) as calculated from the following:    Height as of this encounter: 5' 3\" (1.6 m). -   Weight as of this encounter: 264 lb 6.4 oz (119.9 kg).   - General: pleasant, no distress, good eye contact  - HEENT: no pallor, no periorbital edema, EOMI  - Neck: supple, no thyromegaly, no nodules  - Cardiovascular: regular, normal rate, normal S1 and S2, no murmurs  - Respiratory: clear to auscultation bilaterally  - Gastrointestinal: soft, nontender, nondistended,  BS +  - Musculoskeletal: no proximal muscle weakness in upper or lower extremities  - Integumentary: no edema,   - Neurological: alert and oriented  - Psychiatric: normal mood and affect  - Skin: color, texture, turgor normal.     Diabetic foot exam: March 2018    Left:     Vibratory sensation absent   Filament test decreased sensation with micro filament   Pulse DP: 1+    Deformities: None  Right:    Vibratory sensation absent   Filament test decreased sensation with micro filament   Pulse DP: 1+   Deformities: None    Data Reviewed:     [] Glucose records reviewed. [] See glucose records for details (to be scanned). [] A1C  [] Reviewed labs    no labs    Assessment/Plan:     1. Type 2 diabetes mellitus without complication, with long-term current use of insulin ([address]    2. Uncontrolled type 2 diabetes mellitus with hyperglycemia, with long-term current use of insulin ([address]    3. Type 2 diabetes mellitus with hyperglycemia, with long-term current use of insulin ([address]    4. Essential hypertension with goal blood pressure less than 140/90    5. Mixed hyperlipidemia        1. Type 2 Diabetes Mellitus with neuropathy,  Lab Results   Component Value Date/Time    Hemoglobin A1c (POC) 8.4 07/30/2018 12:09 PM   Uncontrolled, improved from 69-4.4  On Trulicity 1.5 mg weekly -discussed about Ozempic -given sample, she will discontinue Trulicity   Continue insulin glargine   Metformin   Novolog 8 units pre dinner   Advised to check glucose 2 - 4 times daily  [employer] patient's can use the [employer] card, will discuss with pharmacy . 2. HTN : Continue current therapy     3. Hyperlipidemia : Continue statin. 4.Obesity:Body mass index is 46.84 kg/(m^2). Discussed about the importance of exercise and carbohydrate portion control.           Patient Instructions   Basaglar 75 units at bedtime    Metformin 1000 mg twice day     Trulicity 1.5 mg weekly or Ozempic 1 mg     Novolog 8 units before dinner     Additional Novolog for high sugars     Blood sugar  Breakfast/Lunch/Dinner         150-200  Add  4  Units       201-250  Add  8 Units       251-300  Add  12 Units       301-350  Add 16  Units        351-400  Add 20  Units     Follow-up Disposition:  Return in about 4 months (around 11/30/2018). Thank you for allowing me to participate in the care of this patient.     Christian Johnson MD      Patient verbalized understanding

## 2018-07-30 NOTE — PATIENT INSTRUCTIONS
Basaglar 75 units at bedtime    Metformin 1000 mg twice day     Trulicity 1.5 mg weekly or Ozempic 1 mg     Novolog 8 units before dinner     Additional Novolog for high sugars     Blood sugar  Breakfast/Lunch/Dinner         150-200  Add  4  Units       201-250  Add  8 Units       251-300  Add  12 Units       301-350  Add 16  Units        351-400  Add 20  Units

## 2018-07-30 NOTE — PROGRESS NOTES
Melody Brar is a 66 y.o. female here for   Chief Complaint   Patient presents with    Diabetes       Functional glucose monitor and record keeping system? - yes  Eye exam within last year? - on file  Foot exam within last year? - on file    1. Have you been to the ER, urgent care clinic since your last visit? Hospitalized since your last visit? - no    2. Have you seen or consulted any other health care providers outside of the [address] [place] [address] since your last visit?   Include any pap smears or colon screening.- PCP

## 2018-07-30 NOTE — MR AVS SNAPSHOT
[address] [facility] [address] 
[phone] Patient: Santo Dotson MRN: [id number] VNQ:[id number] Visit Information Date & Time Provider Department Dept. Phone Encounter #  
 7/30/2018 11:45 AM Keith Stern MD [facility] [phone] [id number] Follow-up Instructions Return in about 4 months (around 11/30/2018). Your Appointments 11/16/2018 11:15 AM  
ROUTINE CARE with Keith Stern MD  
[facility]) [address] [place] [address]  
[phone]  
  
   
 [address] [place] [id number] Upcoming Health Maintenance Date Due Hepatitis C Screening 1960 LIPID PANEL Q1 1960 Pneumococcal 19-64 Medium Risk (1 of 1 - PPSV23) 7/10/1981 DTaP/Tdap/Td series (1 - Tdap) 7/10/1983 PAP AKA CERVICAL CYTOLOGY 7/10/1983 BREAST CANCER SCRN MAMMOGRAM 7/10/2012 FOBT Q 1 YEAR AGE 50-75 7/10/2012 Influenza Age 5 to Adult 8/1/2018 HEMOGLOBIN A1C Q6M 9/12/2018 EYE EXAM RETINAL OR DILATED Q1 1/3/2019 FOOT EXAM Q1 3/12/2019 MICROALBUMIN Q1 3/12/2019 Allergies as of 7/30/2018  Review Complete On: 7/30/2018 By: Keith Stern MD  
  
 Severity Noted Reaction Type Reactions Lisinopril  09/26/2016    Cough \"lungs shut down\" Current Immunizations  Never Reviewed No immunizations on file. Not reviewed this visit You Were Diagnosed With   
  
 Codes Comments Type 2 diabetes mellitus without complication, with long-term current use of insulin (HCC)    -  Primary ICD-10-CM: E11.9, Z79.4 ICD-9-CM: 250.00, V58.67 Uncontrolled type 2 diabetes mellitus with hyperglycemia, with long-term current use of insulin (HCC)     ICD-10-CM: E11.65, Z79.4 ICD-9-CM: 250.02, V58.67  Type 2 diabetes mellitus with hyperglycemia, with long-term current use of insulin ([facility]     ICD-10-CM: E11.65, Z79.4 ICD-9-CM: 250.00, 790.29, V58.67 Vitals BP Pulse Temp Resp Height(growth percentile) Weight(growth percentile) (!) 160/98 (BP 1 Location: Right arm, BP Patient Position: Sitting) 100 98.7 °F (37.1 °C) (Oral) 16 5' 3\" (1.6 m) 264 lb 6.4 oz (119.9 kg) SpO2 BMI OB Status Smoking Status 98% 46.84 kg/m2 Menopause Former Smoker Vitals History BMI and BSA Data Body Mass Index Body Surface Area  
 46.84 kg/m 2 2.31 m 2 Preferred Pharmacy Pharmacy Name Phone [id number] [place] [phone] Your Updated Medication List  
  
   
This list is accurate as of 7/30/18 12:49 PM.  Always use your most recent med list.  
  
  
  
  
 atorvastatin 20 mg tablet Commonly known as:  LIPITOR  
TAKE 1 TABLET BY MOUTH AT BEDTIME  
  
 BC HEADACHE POWDER PO Take  by mouth. chlorthalidone 25 mg tablet Commonly known as:  HYGROTEN  
TAKE 1 TABLET BY MOUTH EVERY DAY  
  
 dilTIAZem  mg ER capsule Commonly known as:  CARDIZEM CD  
TAKE 1 CAP BY MOUTH ONCE A DAY. dulaglutide 1.5 mg/0.5 mL sub-q pen Commonly known as:  TRULICITY  
0.5 mL by SubCUTAneous route every seven (7) days. fenofibrate nanocrystallized 145 mg tablet Commonly known as:  TRICOR  
TAKE 1 TABET BY MOUTH EVERY DAY  
  
 glucose blood VI test strips strip Commonly known as:  ACCU-CHEK CARLOS PLUS TEST STRP Test TID Dx Code: E11.65  
  
 insulin glargine 100 unit/mL (3 mL) Inpn Commonly known as:  BASAGLAR KWIKPEN U-100 INSULIN Inject 75 units at bedtime Insulin Needles (Disposable) 32 gauge x 5/32\" Ndle Commonly known as:  Betzaida Pen Needle Use to inject Basaglar daily Dx Code: E11.65 Lancets Misc Commonly known as:  ACCU-CHEK FASTCLIX Test TID Dx Code: E11.65  
  
 losartan 100 mg tablet Commonly known as:  COZAAR  
TAKE 1 TABLET BY MOUTH EVERY DAY  
  
 metFORMIN 1,000 mg tablet Commonly known as:  GLUCOPHAGE Take 1 Tab by mouth two (2) times daily (with meals). NovoLOG U-100 Insulin aspart 100 unit/mL injection Generic drug:  insulin aspart U-100  
18 units before each meal w/ SSI Max units daily: 60 We Performed the Following AMB POC GLUCOSE BLOOD, BY GLUCOSE MONITORING DEVICE [02512 CPT(R)] AMB POC HEMOGLOBIN A1C [66882 CPT(R)] Follow-up Instructions Return in about 4 months (around 11/30/2018). Patient Instructions Basaglar 75 units at bedtime Metformin 1000 mg twice day Trulicity 1.5 mg weekly Novolog 8 units before dinner Additional Novolog for high sugars Blood sugar  Breakfast/Lunch/Dinner 150-200  Add  4  Units 201-250  Add  8 Units 251-300  Add  12 Units 301-350  Add 16  Units 351-400  Add 20  Units Introducing [facility]! [facility] introduces EverConnect patient portal. Now you can access parts of your medical record, email your doctor's office, and request medication refills online. 1. In your internet browser, go to [email or web address] 2. Click on the First Time User? Click Here link in the Sign In box. You will see the New Member Sign Up page. 3. Enter your EverConnect Access Code exactly as it appears below. You will not need to use this code after youve completed the sign-up process. If you do not sign up before the expiration date, you must request a new code. · EverConnect Access Code: [email or web address] Expires: 10/28/2018 12:49 PM 
 
4. Enter the last four digits of your Social Security Number (xxxx) and Date of Birth (mm/dd/yyyy) as indicated and click Submit. You will be taken to the next sign-up page. 5. Create a EverConnect ID. This will be your EverConnect login ID and cannot be changed, so think of one that is secure and easy to remember. 6. Create a [place] password. You can change your password at any time. 7. Enter your Password Reset Question and Answer. This can be used at a later time if you forget your password. 8. Enter your e-mail address. You will receive e-mail notification when new information is available in [address] 9. Click Sign Up. You can now view and download portions of your medical record. 10. Click the Download Summary menu link to download a portable copy of your medical information. If you have questions, please visit the Frequently Asked Questions section of the [employer] website. Remember, [facility] is NOT to be used for urgent needs. For medical emergencies, dial 911. Now available from your iPhone and Android! Please provide this summary of care documentation to your next provider. If you have any questions after today's visit, please call [phone].

## 2018-09-14 DIAGNOSIS — E11.65 TYPE 2 DIABETES MELLITUS WITH HYPERGLYCEMIA, UNSPECIFIED WHETHER LONG TERM INSULIN USE (HCC): Primary | ICD-10-CM

## 2018-09-14 NOTE — TELEPHONE ENCOUNTER
Patient says Geovani Neighbours too expensive and would like to go back to Trulicity. Patient says she is now completley out of both medications.

## 2018-09-23 DIAGNOSIS — Z79.4 UNCONTROLLED TYPE 2 DIABETES MELLITUS WITH HYPERGLYCEMIA, WITH LONG-TERM CURRENT USE OF INSULIN (HCC): ICD-10-CM

## 2018-09-23 DIAGNOSIS — Z79.4 TYPE 2 DIABETES MELLITUS WITH HYPERGLYCEMIA, WITH LONG-TERM CURRENT USE OF INSULIN (HCC): ICD-10-CM

## 2018-09-23 DIAGNOSIS — E11.65 TYPE 2 DIABETES MELLITUS WITH HYPERGLYCEMIA, WITH LONG-TERM CURRENT USE OF INSULIN (HCC): ICD-10-CM

## 2018-09-23 DIAGNOSIS — E11.65 UNCONTROLLED TYPE 2 DIABETES MELLITUS WITH HYPERGLYCEMIA, WITH LONG-TERM CURRENT USE OF INSULIN (HCC): ICD-10-CM

## 2018-09-23 RX ORDER — DULAGLUTIDE 1.5 MG/.5ML
INJECTION, SOLUTION SUBCUTANEOUS
Qty: 2 SYRINGE | Refills: 5 | Status: SHIPPED | OUTPATIENT
Start: 2018-09-23 | End: 2019-04-04 | Stop reason: SDUPTHER

## 2018-10-11 DIAGNOSIS — E11.65 UNCONTROLLED TYPE 2 DIABETES MELLITUS WITH HYPERGLYCEMIA, WITH LONG-TERM CURRENT USE OF INSULIN (HCC): ICD-10-CM

## 2018-10-11 DIAGNOSIS — Z79.4 UNCONTROLLED TYPE 2 DIABETES MELLITUS WITH HYPERGLYCEMIA, WITH LONG-TERM CURRENT USE OF INSULIN (HCC): ICD-10-CM

## 2018-10-11 RX ORDER — PEN NEEDLE, DIABETIC 32GX 5/32"
NEEDLE, DISPOSABLE MISCELLANEOUS
Qty: 100 PEN NEEDLE | Refills: 11 | Status: SHIPPED | OUTPATIENT
Start: 2018-10-11 | End: 2019-10-18 | Stop reason: SDUPTHER

## 2018-11-16 ENCOUNTER — OFFICE VISIT (OUTPATIENT)
Dept: ENDOCRINOLOGY | Age: 58
End: 2018-11-16

## 2018-11-16 VITALS
TEMPERATURE: 98.5 F | SYSTOLIC BLOOD PRESSURE: 187 MMHG | HEART RATE: 87 BPM | BODY MASS INDEX: 45.18 KG/M2 | HEIGHT: 63 IN | WEIGHT: 255 LBS | OXYGEN SATURATION: 98 % | DIASTOLIC BLOOD PRESSURE: 89 MMHG | RESPIRATION RATE: 16 BRPM

## 2018-11-16 DIAGNOSIS — E11.21 TYPE 2 DIABETES WITH NEPHROPATHY (HCC): Primary | ICD-10-CM

## 2018-11-16 DIAGNOSIS — I10 ESSENTIAL HYPERTENSION WITH GOAL BLOOD PRESSURE LESS THAN 140/90: ICD-10-CM

## 2018-11-16 DIAGNOSIS — E78.2 MIXED HYPERLIPIDEMIA: ICD-10-CM

## 2018-11-16 DIAGNOSIS — Z79.4 TYPE 2 DIABETES MELLITUS WITH HYPERGLYCEMIA, WITH LONG-TERM CURRENT USE OF INSULIN (HCC): ICD-10-CM

## 2018-11-16 DIAGNOSIS — E11.65 TYPE 2 DIABETES MELLITUS WITH HYPERGLYCEMIA, WITH LONG-TERM CURRENT USE OF INSULIN (HCC): ICD-10-CM

## 2018-11-16 LAB
GLUCOSE POC: 205 MG/DL
HBA1C MFR BLD HPLC: 9.1 %

## 2018-11-16 RX ORDER — INSULIN GLARGINE 100 [IU]/ML
INJECTION, SOLUTION SUBCUTANEOUS
Qty: 30 ML | Refills: 11 | Status: SHIPPED | OUTPATIENT
Start: 2018-11-16 | End: 2019-10-26 | Stop reason: SDUPTHER

## 2018-11-16 NOTE — PROGRESS NOTES
[facility]               Rimma Donaldson MD        [address] [address] GD:[phone] Fax [phone]               Patient Information  Date:11/16/2018  Name : Wing Batista 66 y.o.     YOB: 1960                 Chief Complaint   Patient presents with    Diabetes       History of Present Illness: Wing Batista is a 66 y.o. female here for followupof  Type 2 Diabetes Mellitus. Type 2 Diabetes was diagnosed 24 years  . End organ effects of diabetes: peripheral neuropathy. 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 No chest pain or shortness of breath. Planning to resume exercise, and considering her diet plan    Has lost some weight      Wt Readings from Last 3 Encounters:   11/16/18 255 lb (115.7 kg)   07/30/18 264 lb 6.4 oz (119.9 kg)   03/12/18 268 lb (121.6 kg)       BP Readings from Last 3 Encounters:   11/16/18 187/89   07/30/18 (!) 160/98   03/12/18 144/71           Past Medical History:   Diagnosis Date    Diabetes mellitus, type II ([address] 1993    Hyperlipidemia     Hypertension      Current Outpatient Medications   Medication Sig    semaglutide (OZEMPIC) 0.25 mg/0.2 mL (2 mg/1.5 mL) sub-q pen 0.5 mg by SubCUTAneous route every seven (7) days. Dispense as sample per Dr Abdullahi Cain 32 gauge x 5/32\" ndle USE TO INJECT BASAGLAR DAILY DX CODE: E11.65    dulaglutide (TRULICITY) 1.5 IO/3.4 mL sub-q pen 0.5 mL by SubCUTAneous route every seven (7) days.     glucose blood VI test strips (ACCU-CHEK CARLOS PLUS TEST STRP) strip Test TID Dx Code: E11.65    Lancets (ACCU-CHEK FASTCLIX) misc Test TID Dx Code: E11.65    metFORMIN (GLUCOPHAGE) 1,000 mg tablet Take 1 Tab by mouth two (2) times daily (with meals).  atorvastatin (LIPITOR) 20 mg tablet TAKE 1 TABLET BY MOUTH AT BEDTIME    chlorthalidone (HYGROTEN) 25 mg tablet TAKE 1 TABLET BY MOUTH EVERY DAY    diltiazem CD (CARDIZEM CD) 300 mg ER capsule TAKE 1 CAP BY MOUTH ONCE A DAY.  fenofibrate nanocrystallized (TRICOR) 145 mg tablet TAKE 1 TABET BY MOUTH EVERY DAY    losartan (COZAAR) 100 mg tablet TAKE 1 TABLET BY MOUTH EVERY DAY    ASPIRIN/SALICYLAMIDE/CAFFEINE (BC HEADACHE POWDER PO) Take  by mouth.  insulin glargine (BASAGLAR KWIKPEN U-100 INSULIN) 100 unit/mL (3 mL) inpn Inject 50 units BID    TRULICITY 1.5 FJ/4.7 mL sub-q pen INJECT 0.5ML SUB-Q EVERY 7 DAYS    NOVOLOG U-100 INSULIN ASPART 100 unit/mL injection 18 units before each meal w/ SSI Max units daily: 60     No current facility-administered medications for this visit. Allergies   Allergen Reactions    Lisinopril Cough     \"lungs shut down\"         Review of Systems:  -   - Gastrointestinal: no dysphagia no  abdominal pain  - Musculoskeletal: no joint pains +  weakness  - Integumentary: no rashes  - Neurological: + numbness, tingling, no  headaches  - Psychiatric: no depression no  anxiety  - Endocrine: no heat or cold intolerance    Physical Examination:   Blood pressure 187/89, pulse 87, temperature 98.5 °F (36.9 °C), temperature source Oral, resp. rate 16, height 5' 3\" (1.6 m), weight 255 lb (115.7 kg), SpO2 98 %. Estimated body mass index is 45.17 kg/m² as calculated from the following:    Height as of this encounter: 5' 3\" (1.6 m). -   Weight as of this encounter: 255 lb (115.7 kg).   - General: pleasant, no distress, good eye contact  - HEENT: no pallor, no periorbital edema, EOMI  - Neck: supple, no thyromegaly, no nodules  - Cardiovascular: regular, normal rate, normal S1 and S2, no murmurs  - Respiratory: clear to auscultation bilaterally  - Gastrointestinal: soft, nontender, nondistended,  BS +  - Musculoskeletal: no proximal muscle weakness in upper or lower extremities  - Integumentary: no edema,   - Neurological: alert and oriented  - Psychiatric: normal mood and affect  - Skin: color, texture, turgor normal.     Diabetic foot exam: March 2018    Left:     Vibratory sensation absent   Filament test decreased sensation with micro filament   Pulse DP: 1+    Deformities: None  Right:    Vibratory sensation absent   Filament test decreased sensation with micro filament   Pulse DP: 1+   Deformities: None    Data Reviewed:     Lab Results   Component Value Date/Time    Glucose 92 03/12/2018 11:28 AM    Glucose  11/16/2018 11:25 AM    Microalb/Creat ratio (ug/mg creat.) 2,743.8 (H) 03/12/2018 11:28 AM    LDL,Direct 162 (H) 03/12/2018 11:28 AM    Creatinine 1.23 (H) 03/12/2018 11:28 AM      Lab Results   Component Value Date/Time    GFR est non-AA 49 (L) 03/12/2018 11:28 AM    GFR est AA 57 (L) 03/12/2018 11:28 AM    Creatinine 1.23 (H) 03/12/2018 11:28 AM    BUN 30 (H) 03/12/2018 11:28 AM    Sodium 142 03/12/2018 11:28 AM    Potassium 4.2 03/12/2018 11:28 AM    Chloride 102 03/12/2018 11:28 AM    CO2 22 03/12/2018 11:28 AM         Assessment/Plan:     1. Type 2 diabetes with nephropathy ([address]    2. Mixed hyperlipidemia    3. Essential hypertension with goal blood pressure less than 140/90        1. Type 2 Diabetes Mellitus with neuropathy,  Lab Results   Component Value Date/Time    Hemoglobin A1c (POC) 9.1 11/16/2018 11:25 AM   Uncontrolled,  On Trulicity 1.5 mg weekly or 1 mg Ozempic -  Basaglar 50 units twice daily   Metformin   Novolog 8 units pre dinner   Advised to check glucose 2 - 4 times daily  [employer] patient's can use the [employer] card, will discuss with pharmacy . 2. HTN : Continue current therapy     3. Hyperlipidemia : Continue statin. 4.Obesity:Body mass index is 45.17 kg/m². Discussed about the importance of exercise and carbohydrate portion control.           Patient Instructions   Basaglar 50 units twice a day     Metformin 1000 mg twice day     Trulicity 1.5 mg weekly or Ozempic 1 mg     Novolog 8 units before dinner     Additional Novolog for high sugars     Blood sugar  Breakfast/Lunch/Dinner         150-200  Add  4  Units       201-250  Add  8 Units       251-300  Add  12 Units       301-350  Add 16  Units        351-400  Add 20  Units     Follow-up Disposition:  Return in about 4 months (around 3/16/2019). Thank you for allowing me to participate in the care of this patient. Emeka Brumfield MD      Patient verbalized understanding     Voice-recognition software was used to generate this report, which may result in some phonetic-based errors in the grammar and contents. Even though attempts were made to correct all the mistakes, some may have been missed and remained in the body of the report.

## 2018-11-16 NOTE — PATIENT INSTRUCTIONS
Basaglar 50 units twice a day     Metformin 1000 mg twice day     Trulicity 1.5 mg weekly or Ozempic 1 mg     Novolog 8 units before dinner     Additional Novolog for high sugars     Blood sugar  Breakfast/Lunch/Dinner         150-200  Add  4  Units       201-250  Add  8 Units       251-300  Add  12 Units       301-350  Add 16  Units        351-400  Add 20  Units

## 2018-11-16 NOTE — PROGRESS NOTES
Oz Pitts is a 66 y.o. female here for   Chief Complaint   Patient presents with    Diabetes       Functional glucose monitor and record keeping system? - yes  Eye exam within last year? - on file  Foot exam within last year? - on file    1. Have you been to the ER, urgent care clinic since your last visit? Hospitalized since your last visit? -no    2. Have you seen or consulted any other health care providers outside of the [address] [place] [address] since your last visit?   Include any pap smears or colon screening.-Dr Oralia Gonzáles PCP

## 2019-04-04 ENCOUNTER — OFFICE VISIT (OUTPATIENT)
Dept: ENDOCRINOLOGY | Age: 59
End: 2019-04-04

## 2019-04-04 VITALS
DIASTOLIC BLOOD PRESSURE: 74 MMHG | HEART RATE: 100 BPM | WEIGHT: 263 LBS | HEIGHT: 63 IN | RESPIRATION RATE: 18 BRPM | SYSTOLIC BLOOD PRESSURE: 133 MMHG | TEMPERATURE: 97.7 F | OXYGEN SATURATION: 100 % | BODY MASS INDEX: 46.6 KG/M2

## 2019-04-04 DIAGNOSIS — E78.2 MIXED HYPERLIPIDEMIA: ICD-10-CM

## 2019-04-04 DIAGNOSIS — I10 ESSENTIAL HYPERTENSION WITH GOAL BLOOD PRESSURE LESS THAN 140/90: ICD-10-CM

## 2019-04-04 DIAGNOSIS — E11.9 TYPE 2 DIABETES MELLITUS WITHOUT COMPLICATION, WITH LONG-TERM CURRENT USE OF INSULIN (HCC): Primary | ICD-10-CM

## 2019-04-04 DIAGNOSIS — E11.65 UNCONTROLLED TYPE 2 DIABETES MELLITUS WITH HYPERGLYCEMIA, WITH LONG-TERM CURRENT USE OF INSULIN (HCC): ICD-10-CM

## 2019-04-04 DIAGNOSIS — Z79.4 TYPE 2 DIABETES MELLITUS WITHOUT COMPLICATION, WITH LONG-TERM CURRENT USE OF INSULIN (HCC): Primary | ICD-10-CM

## 2019-04-04 DIAGNOSIS — Z79.4 UNCONTROLLED TYPE 2 DIABETES MELLITUS WITH HYPERGLYCEMIA, WITH LONG-TERM CURRENT USE OF INSULIN (HCC): ICD-10-CM

## 2019-04-04 DIAGNOSIS — Z79.4 TYPE 2 DIABETES MELLITUS WITH HYPERGLYCEMIA, WITH LONG-TERM CURRENT USE OF INSULIN (HCC): ICD-10-CM

## 2019-04-04 DIAGNOSIS — E11.65 TYPE 2 DIABETES MELLITUS WITH HYPERGLYCEMIA, WITH LONG-TERM CURRENT USE OF INSULIN (HCC): ICD-10-CM

## 2019-04-04 RX ORDER — FUROSEMIDE 20 MG/1
20 TABLET ORAL DAILY
Qty: 3 TAB | Refills: 0 | Status: SHIPPED | OUTPATIENT
Start: 2019-04-04 | End: 2019-04-07

## 2019-04-04 RX ORDER — INSULIN ASPART 100 [IU]/ML
INJECTION, SOLUTION INTRAVENOUS; SUBCUTANEOUS
Qty: 30 ML | Refills: 11 | Status: SHIPPED | OUTPATIENT
Start: 2019-04-04 | End: 2020-04-13

## 2019-04-04 NOTE — PROGRESS NOTES
[facility] DIABETES AND ENDOCRINOLOGY Jeryl Buerger ,MD 
 
    [address] [phone] Fax [phone] Patient Information Date:4/4/2019 Name : Anthony Lara 66 y.o.    
YOB: 1960 Chief Complaint Patient presents with  Diabetes History of Present Illness: Anthony Lara is a 66 y.o. female here for followupof  Type 2 Diabetes Mellitus. Type 2 Diabetes was diagnosed 24 years  . End organ effects of diabetes: peripheral neuropathy. 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  
04/04/19 263 lb (119.3 kg)  
11/16/18 255 lb (115.7 kg) 07/30/18 264 lb 6.4 oz (119.9 kg) BP Readings from Last 3 Encounters:  
04/04/19 133/74  
11/16/18 187/89  
07/30/18 (!) 160/98 Past Medical History:  
Diagnosis Date  Diabetes mellitus, type II ([address] 1993  Hyperlipidemia  Hypertension Current Outpatient Medications Medication Sig  
 insulin glargine (BASAGLAR KWIKPEN U-100 INSULIN) 100 unit/mL (3 mL) inpn Inject 50 units BID  
 ITZEL PEN NEEDLE 32 gauge x 5/32\" ndle USE TO INJECT BASAGLAR DAILY DX CODE: E11.65  
 dulaglutide (TRULICITY) 1.5 WM/2.3 mL sub-q pen 0.5 mL by SubCUTAneous route every seven (7) days.  glucose blood VI test strips (ACCU-CHEK CARLOS PLUS TEST STRP) strip Test TID Dx Code: E11.65  
 NOVOLOG U-100 INSULIN ASPART 100 unit/mL injection 18 units before each meal w/ SSI Max units daily: 60  Lancets (ACCU-CHEK FASTCLIX) misc Test TID Dx Code: E11.65  
  metFORMIN (GLUCOPHAGE) 1,000 mg tablet Take 1 Tab by mouth two (2) times daily (with meals).  atorvastatin (LIPITOR) 20 mg tablet TAKE 1 TABLET BY MOUTH AT BEDTIME  chlorthalidone (HYGROTEN) 25 mg tablet TAKE 1 TABLET BY MOUTH EVERY DAY  diltiazem CD (CARDIZEM CD) 300 mg ER capsule TAKE 1 CAP BY MOUTH ONCE A DAY.  fenofibrate nanocrystallized (TRICOR) 145 mg tablet TAKE 1 TABET BY MOUTH EVERY DAY  losartan (COZAAR) 100 mg tablet TAKE 1 TABLET BY MOUTH EVERY DAY  ASPIRIN/SALICYLAMIDE/CAFFEINE (BC HEADACHE POWDER PO) Take  by mouth. No current facility-administered medications for this visit. Allergies Allergen Reactions  Lisinopril Cough and Angioedema \"lungs shut down\" Pt confirms she do not have an Epi Pen prescribed to date - 12/11/18 Review of Systems: 
-  
- Gastrointestinal: no dysphagia no  abdominal pain - Musculoskeletal: no joint pains +  weakness - Integumentary: no rashes - Neurological: + numbness, tingling, no  headaches - Psychiatric: no depression no  anxiety - Endocrine: no heat or cold intolerance Physical Examination: 
 Blood pressure 133/74, pulse 100, temperature 97.7 °F (36.5 °C), temperature source Oral, resp. rate 18, height 5' 3\" (1.6 m), weight 263 lb (119.3 kg), SpO2 100 %. Estimated body mass index is 46.59 kg/m² as calculated from the following: 
  Height as of this encounter: 5' 3\" (1.6 m). -   Weight as of this encounter: 263 lb (119.3 kg). - General: pleasant, no distress, good eye contact 
- HEENT: no pallor, no periorbital edema, EOMI 
- Neck: supple, no thyromegaly, no nodules - Cardiovascular: regular, normal rate, normal S1 and S2, no murmurs - Respiratory: clear to auscultation bilaterally - Gastrointestinal: soft, nontender, nondistended,  BS + 
- Musculoskeletal: no proximal muscle weakness in upper or lower extremities - Integumentary: Edema 
- Neurological: alert and oriented - Psychiatric: normal mood and affect - Skin: color, texture, turgor normal.  
 
Diabetic foot exam: April 2019 Left:   
 Vibratory sensation absent Filament test decreased sensation with micro filament Pulse DP: 1+ Deformities: None Right:  
 Vibratory sensation absent Filament test decreased sensation with micro filament Pulse DP: 1+ Deformities: None Data Reviewed:  
 
Lab Results Component Value Date/Time Hemoglobin A1c 10.3 (H) 03/19/2019 10:10 AM  
 Glucose 362 (H) 03/19/2019 10:10 AM  
 Glucose  11/16/2018 11:25 AM  
 Microalb/Creat ratio (ug/mg creat.) 2,954.6 (H) 03/19/2019 10:10 AM  
 LDL,Direct 162 (H) 03/12/2018 11:28 AM  
 LDL, calculated 133 (H) 03/19/2019 10:10 AM  
 Creatinine 1.46 (H) 03/19/2019 10:10 AM  
  
Lab Results Component Value Date/Time GFR est non-AA 40 (L) 03/19/2019 10:10 AM  
 GFR est AA 46 (L) 03/19/2019 10:10 AM  
 Creatinine 1.46 (H) 03/19/2019 10:10 AM  
 BUN 30 (H) 03/19/2019 10:10 AM  
 Sodium 136 03/19/2019 10:10 AM  
 Potassium 4.4 03/19/2019 10:10 AM  
 Chloride 99 03/19/2019 10:10 AM  
 CO2 22 03/19/2019 10:10 AM  
 
 
 
Assessment/Plan: 1. Type 2 diabetes mellitus without complication, with long-term current use of insulin ([address] 2. Essential hypertension with goal blood pressure less than 140/90   
3. Mixed hyperlipidemia 1. Type 2 Diabetes Mellitus with neuropathy, 
Lab Results Component Value Date/Time Hemoglobin A1c 10.3 (H) 03/19/2019 10:10 AM  
 Hemoglobin A1c (POC) 9.1 11/16/2018 11:25 AM  
Uncontrolled, On Trulicity 1.5 mg weekly or 1 mg Ozempic Basaglar 50 units twice daily Metformin Novolog 20 units BID Advised to check glucose 2 - 4 times daily Federal insurance patient's can use the savings card, will discuss with pharmacy . 2. HTN : Continue current therapy 3. Hyperlipidemia : Continue statin. 4.Obesity:Body mass index is 46.59 kg/m². Discussed about the importance of exercise and carbohydrate portion control. 5. Peripheral edema: Has underlying CKD, low-salt diet Compressive stockings To call if no improvement There are no Patient Instructions on file for this visit. Thank you for allowing me to participate in the care of this patient. Fredrick Liu MD 
 
 
Patient verbalized understanding Voice-recognition software was used to generate this report, which may result in some phonetic-based errors in the grammar and contents. Even though attempts were made to correct all the mistakes, some may have been missed and remained in the body of the report.

## 2019-04-04 NOTE — PATIENT INSTRUCTIONS
Basaglar 50 units twice a day Metformin 1000 mg twice day Trulicity 1.5 mg weekly or Ozempic 1 mg Novolog 20 units before lunch and 20  units before dinner Additional Novolog for high sugars Blood sugar  Breakfast/Lunch/Dinner 150-200  Add  4  Units 201-250  Add  8 Units 251-300  Add  12 Units 301-350  Add 16  Units 351-400  Add 20  Units

## 2019-04-04 NOTE — PROGRESS NOTES
1. Have you been to the ER, urgent care clinic since your last visit? No  Hospitalized since your last visit? No 
 
2. Have you seen or consulted any other health care providers outside of the [address] [place] [address] since your last visit? Include any pap smears or colon screening. No 
 
 
Wt Readings from Last 3 Encounters:  
04/04/19 263 lb (119.3 kg)  
11/16/18 255 lb (115.7 kg) 07/30/18 264 lb 6.4 oz (119.9 kg) Temp Readings from Last 3 Encounters:  
04/04/19 97.7 °F (36.5 °C) (Oral)  
11/16/18 98.5 °F (36.9 °C) (Oral) 07/30/18 98.7 °F (37.1 °C) (Oral) BP Readings from Last 3 Encounters:  
04/04/19 133/74  
11/16/18 187/89  
07/30/18 (!) 160/98 Pulse Readings from Last 3 Encounters:  
04/04/19 100  
11/16/18 87  
07/30/18 100 Lab Results Component Value Date/Time Hemoglobin A1c 10.3 (H) 03/19/2019 10:10 AM  
 Hemoglobin A1c (POC) 9.1 11/16/2018 11:25 AM  
 
Patient does not have meter today

## 2019-04-09 ENCOUNTER — TELEPHONE (OUTPATIENT)
Dept: ENDOCRINOLOGY | Age: 59
End: 2019-04-09

## 2019-07-08 NOTE — PROGRESS NOTES
Sanchez Barrera is a 66 y.o. female here for   Chief Complaint   Patient presents with    Diabetes       Functional glucose monitor and record keeping system? -yes   Eye exam within last year? - on file  Foot exam within last year? - on file    1. Have you been to the ER, urgent care clinic since your last visit? Hospitalized since your last visit? -no    2. Have you seen or consulted any other health care providers outside of the [address] [place] [address] since your last visit?   Include any pap smears or colon screening.-no

## 2019-07-09 ENCOUNTER — OFFICE VISIT (OUTPATIENT)
Dept: ENDOCRINOLOGY | Age: 59
End: 2019-07-09

## 2019-07-09 VITALS
TEMPERATURE: 96.4 F | BODY MASS INDEX: 45.89 KG/M2 | RESPIRATION RATE: 18 BRPM | SYSTOLIC BLOOD PRESSURE: 188 MMHG | DIASTOLIC BLOOD PRESSURE: 93 MMHG | WEIGHT: 259 LBS | HEART RATE: 101 BPM | OXYGEN SATURATION: 99 % | HEIGHT: 63 IN

## 2019-07-09 DIAGNOSIS — E78.2 MIXED HYPERLIPIDEMIA: ICD-10-CM

## 2019-07-09 DIAGNOSIS — E11.21 TYPE 2 DIABETES WITH NEPHROPATHY (HCC): Primary | ICD-10-CM

## 2019-07-09 DIAGNOSIS — I10 ESSENTIAL HYPERTENSION WITH GOAL BLOOD PRESSURE LESS THAN 140/90: ICD-10-CM

## 2019-07-09 DIAGNOSIS — Z79.4 TYPE 2 DIABETES MELLITUS WITH HYPERGLYCEMIA, WITH LONG-TERM CURRENT USE OF INSULIN (HCC): Primary | ICD-10-CM

## 2019-07-09 DIAGNOSIS — E11.65 TYPE 2 DIABETES MELLITUS WITH HYPERGLYCEMIA, WITH LONG-TERM CURRENT USE OF INSULIN (HCC): Primary | ICD-10-CM

## 2019-07-09 RX ORDER — ATENOLOL 25 MG/1
25 TABLET ORAL
Qty: 90 TAB | Refills: 3 | Status: SHIPPED | OUTPATIENT
Start: 2019-07-09 | End: 2020-09-10 | Stop reason: SDUPTHER

## 2019-07-09 NOTE — PROGRESS NOTES
[facility] ENDO[facility]INOLOG[facility]               Miguel Angel Hyatt MD        [address] [address] SV:[phone] Fax [phone]               Patient Information  Date:7/9/2019  Name : Clem Driscoll 66 y.o.     YOB: 1960                 Chief Complaint   Patient presents with    Diabetes       History of Present Illness: Clem Driscoll is a 66 y.o. female here for followupof  Type 2 Diabetes Mellitus. Type 2 Diabetes was diagnosed 24 years  . End organ effects of diabetes: peripheral neuropathy. 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 1993    Hyperlipidemia     Hypertension      Current Outpatient Medications   Medication Sig    NOVOLOG U-100 INSULIN ASPART 100 unit/mL injection 20 units before lunch and dinner w/ SSI Max units daily: 100 (Patient taking differently: 10 units before lunch and dinner w/ SSI Max units daily: 100)    semaglutide (OZEMPIC) 1 mg/dose (2 mg/1.5 mL) sub-q pen 1 mg by SubCUTAneous route every seven (7) days.     insulin glargine (BASAGLAR KWIKPEN U-100 INSULIN) 100 unit/mL (3 mL) inpn Inject 50 units BID    ITZEL PEN NEEDLE 32 gauge x 5/32\" ndle USE TO INJECT BASAGLAR DAILY DX CODE: E11.65    glucose blood VI test strips (ACCU-CHEK CARLOS PLUS TEST STRP) strip Test TID Dx Code: E11.65    Lancets (ACCU-CHEK FASTCLIX) misc Test TID Dx Code: E11.65    metFORMIN (GLUCOPHAGE) 1,000 mg tablet Take 1 Tab by mouth two (2) times daily (with meals).  atorvastatin (LIPITOR) 20 mg tablet TAKE 1 TABLET BY MOUTH AT BEDTIME    chlorthalidone (HYGROTEN) 25 mg tablet TAKE 1 TABLET BY MOUTH EVERY DAY    diltiazem CD (CARDIZEM CD) 300 mg ER capsule TAKE 1 CAP BY MOUTH ONCE A DAY.  fenofibrate nanocrystallized (TRICOR) 145 mg tablet TAKE 1 TABET BY MOUTH EVERY DAY    losartan (COZAAR) 100 mg tablet TAKE 1 TABLET BY MOUTH EVERY DAY    ASPIRIN/SALICYLAMIDE/CAFFEINE (BC HEADACHE POWDER PO) Take  by mouth. No current facility-administered medications for this visit. Allergies   Allergen Reactions    Lisinopril Cough and Angioedema     \"lungs shut down\"  Pt confirms she do not have an Epi Pen prescribed to date - 12/11/18         Review of Systems:  -   - Gastrointestinal: no dysphagia no  abdominal pain  - Musculoskeletal: no joint pains +  weakness  - Integumentary: no rashes  - Neurological: + numbness, tingling, no  headaches  - Psychiatric: no depression no  anxiety  - Endocrine: no heat or cold intolerance    Physical Examination:   Blood pressure (!) 180/96, pulse 82, temperature 96.4 °F (35.8 °C), temperature source Oral, resp. rate 18, height 5' 3\" (1.6 m), weight 259 lb (117.5 kg), SpO2 99 %. Estimated body mass index is 45.88 kg/m² as calculated from the following:    Height as of this encounter: 5' 3\" (1.6 m). -   Weight as of this encounter: 259 lb (117.5 kg).   - General: pleasant, no distress, good eye contact  - HEENT: no pallor, no periorbital edema, EOMI  - Neck: supple, no thyromegaly, no nodules  - Cardiovascular: regular, normal rate, normal S1 and S2, no murmurs  - Respiratory: clear to auscultation bilaterally  - Gastrointestinal: soft, nontender, nondistended,  BS +  - Musculoskeletal: no proximal muscle weakness in upper or lower extremities  - Integumentary: Edema  - Neurological: alert and oriented  - Psychiatric: normal mood and affect  - Skin: color, texture, turgor normal.     Diabetic foot exam: April 2019     Left: Vibratory sensation absent   Filament test decreased sensation with micro filament   Pulse DP: 1+    Deformities: None  Right:    Vibratory sensation absent   Filament test decreased sensation with micro filament   Pulse DP: 1+   Deformities: None    Data Reviewed:     Lab Results   Component Value Date/Time    Hemoglobin A1c 10.3 (H) 03/19/2019 10:10 AM    Glucose 362 (H) 03/19/2019 10:10 AM    Glucose  11/16/2018 11:25 AM    Microalb/Creat ratio (ug/mg creat.) 2,954.6 (H) 03/19/2019 10:10 AM    LDL,Direct 162 (H) 03/12/2018 11:28 AM    LDL, calculated 133 (H) 03/19/2019 10:10 AM    Creatinine 1.46 (H) 03/19/2019 10:10 AM      Lab Results   Component Value Date/Time    GFR est non-AA 40 (L) 03/19/2019 10:10 AM    GFR est AA 46 (L) 03/19/2019 10:10 AM    Creatinine 1.46 (H) 03/19/2019 10:10 AM    BUN 30 (H) 03/19/2019 10:10 AM    Sodium 136 03/19/2019 10:10 AM    Potassium 4.4 03/19/2019 10:10 AM    Chloride 99 03/19/2019 10:10 AM    CO2 22 03/19/2019 10:10 AM         Assessment/Plan:     1. Type 2 diabetes with nephropathy ([address]    2. Essential hypertension with goal blood pressure less than 140/90    3. Mixed hyperlipidemia        1. Type 2 Diabetes Mellitus with neuropathy,  Lab Results   Component Value Date/Time    Hemoglobin A1c 10.3 (H) 03/19/2019 10:10 AM    Hemoglobin A1c (POC) 9.1 11/16/2018 11:25 AM      1 mg Ozempic   Basaglar 50 units twice daily   Metformin   Novolog 10 - 20 units BID,   discussed about the diet, need data to adjust insulin    2. HTN : Very high, discussed about the risk  Atenolol,    3. Hyperlipidemia : Continue statin. 4.Obesity:Body mass index is 45.88 kg/m². Discussed about the importance of exercise and carbohydrate portion control. 5.  Peripheral edema: Has underlying CKD, low-salt diet  Compressive stockings          There are no Patient Instructions on file for this visit. Thank you for allowing me to participate in the care of this patient.     Manuel Harper Gustavo Terry MD      Patient verbalized understanding     Voice-recognition software was used to generate this report, which may result in some phonetic-based errors in the grammar and contents. Even though attempts were made to correct all the mistakes, some may have been missed and remained in the body of the report.

## 2019-07-10 LAB
EST. AVERAGE GLUCOSE BLD GHB EST-MCNC: 180 MG/DL
HBA1C MFR BLD: 7.9 % (ref 4.8–5.6)

## 2019-10-18 DIAGNOSIS — Z79.4 UNCONTROLLED TYPE 2 DIABETES MELLITUS WITH HYPERGLYCEMIA, WITH LONG-TERM CURRENT USE OF INSULIN (HCC): ICD-10-CM

## 2019-10-18 DIAGNOSIS — E11.65 UNCONTROLLED TYPE 2 DIABETES MELLITUS WITH HYPERGLYCEMIA, WITH LONG-TERM CURRENT USE OF INSULIN (HCC): ICD-10-CM

## 2019-10-18 RX ORDER — PEN NEEDLE, DIABETIC 32GX 5/32"
NEEDLE, DISPOSABLE MISCELLANEOUS
Qty: 100 PEN NEEDLE | Refills: 11 | Status: SHIPPED | OUTPATIENT
Start: 2019-10-18 | End: 2021-03-10 | Stop reason: SDUPTHER

## 2019-10-26 DIAGNOSIS — E11.65 TYPE 2 DIABETES MELLITUS WITH HYPERGLYCEMIA, WITH LONG-TERM CURRENT USE OF INSULIN (HCC): ICD-10-CM

## 2019-10-26 DIAGNOSIS — Z79.4 TYPE 2 DIABETES MELLITUS WITH HYPERGLYCEMIA, WITH LONG-TERM CURRENT USE OF INSULIN (HCC): ICD-10-CM

## 2019-10-27 RX ORDER — INSULIN GLARGINE 100 [IU]/ML
INJECTION, SOLUTION SUBCUTANEOUS
Qty: 30 ML | Refills: 11 | Status: SHIPPED | OUTPATIENT
Start: 2019-10-27 | End: 2019-12-24 | Stop reason: SDUPTHER

## 2019-12-24 ENCOUNTER — TELEPHONE (OUTPATIENT)
Dept: ENDOCRINOLOGY | Age: 59
End: 2019-12-24

## 2019-12-24 DIAGNOSIS — E11.65 TYPE 2 DIABETES MELLITUS WITH HYPERGLYCEMIA, WITH LONG-TERM CURRENT USE OF INSULIN (HCC): ICD-10-CM

## 2019-12-24 DIAGNOSIS — Z79.4 TYPE 2 DIABETES MELLITUS WITH HYPERGLYCEMIA, WITH LONG-TERM CURRENT USE OF INSULIN (HCC): ICD-10-CM

## 2019-12-24 RX ORDER — INSULIN GLARGINE 100 [IU]/ML
50 INJECTION, SOLUTION SUBCUTANEOUS 2 TIMES DAILY
Qty: 30 ML | Refills: 11 | Status: SHIPPED | OUTPATIENT
Start: 2019-12-24 | End: 2021-03-10 | Stop reason: SDUPTHER

## 2019-12-24 NOTE — TELEPHONE ENCOUNTER
Received fax from [employer] stating Roseanna Mckay is $151 pt is requesting lower out of pocket cost.  Trulicity is $081 and Victoza is $132. Called pt and she stated she didn't know. She has been getting the rx for $25 a month with a savings card. Pt also stated her Basaglar was changed to 50 units BID but rx is still for 75 units once a day. Needs updated rx. Informed her we will send updated and I will contact her pharmacy for further info. Pharmacist stated he's not sure what happened. It is too early to fill the rx. It's not due until the 1st (next week) and it is going through for $25. He stated to go ahead and disregard the fax. Attempted to call pt. Unsuccessful. Left detailed msg with pharmacists msg. A callback number was left for any questions or concerns.

## 2020-04-12 DIAGNOSIS — Z79.4 TYPE 2 DIABETES MELLITUS WITH HYPERGLYCEMIA, WITH LONG-TERM CURRENT USE OF INSULIN (HCC): ICD-10-CM

## 2020-04-12 DIAGNOSIS — Z79.4 UNCONTROLLED TYPE 2 DIABETES MELLITUS WITH HYPERGLYCEMIA, WITH LONG-TERM CURRENT USE OF INSULIN (HCC): ICD-10-CM

## 2020-04-12 DIAGNOSIS — E11.65 UNCONTROLLED TYPE 2 DIABETES MELLITUS WITH HYPERGLYCEMIA, WITH LONG-TERM CURRENT USE OF INSULIN (HCC): ICD-10-CM

## 2020-04-12 DIAGNOSIS — E11.65 TYPE 2 DIABETES MELLITUS WITH HYPERGLYCEMIA, WITH LONG-TERM CURRENT USE OF INSULIN (HCC): ICD-10-CM

## 2020-04-13 RX ORDER — INSULIN ASPART 100 [IU]/ML
INJECTION, SOLUTION INTRAVENOUS; SUBCUTANEOUS
Qty: 10 ML | Refills: 35 | Status: SHIPPED | OUTPATIENT
Start: 2020-04-13 | End: 2021-03-10

## 2020-04-26 ENCOUNTER — OP HISTORICAL/CONVERTED ENCOUNTER (OUTPATIENT)
Dept: OTHER | Age: 60
End: 2020-04-26

## 2020-05-04 ENCOUNTER — OP HISTORICAL/CONVERTED ENCOUNTER (OUTPATIENT)
Dept: OTHER | Age: 60
End: 2020-05-04

## 2020-06-02 DIAGNOSIS — E11.65 TYPE 2 DIABETES MELLITUS WITH HYPERGLYCEMIA, WITH LONG-TERM CURRENT USE OF INSULIN (HCC): ICD-10-CM

## 2020-06-02 DIAGNOSIS — Z79.4 TYPE 2 DIABETES MELLITUS WITH HYPERGLYCEMIA, WITH LONG-TERM CURRENT USE OF INSULIN (HCC): ICD-10-CM

## 2020-06-02 RX ORDER — SEMAGLUTIDE 1.34 MG/ML
1 INJECTION, SOLUTION SUBCUTANEOUS
Qty: 1 BOX | Refills: 1 | OUTPATIENT
Start: 2020-06-02

## 2020-06-04 ENCOUNTER — OP HISTORICAL/CONVERTED ENCOUNTER (OUTPATIENT)
Dept: OTHER | Age: 60
End: 2020-06-04

## 2020-06-09 ENCOUNTER — VIRTUAL VISIT (OUTPATIENT)
Dept: ENDOCRINOLOGY | Age: 60
End: 2020-06-09

## 2020-06-09 DIAGNOSIS — E78.2 MIXED HYPERLIPIDEMIA: ICD-10-CM

## 2020-06-09 DIAGNOSIS — Z79.4 TYPE 2 DIABETES MELLITUS WITH HYPERGLYCEMIA, WITH LONG-TERM CURRENT USE OF INSULIN (HCC): ICD-10-CM

## 2020-06-09 DIAGNOSIS — E11.9 TYPE 2 DIABETES MELLITUS WITHOUT COMPLICATION, WITH LONG-TERM CURRENT USE OF INSULIN (HCC): Primary | ICD-10-CM

## 2020-06-09 DIAGNOSIS — I10 ESSENTIAL HYPERTENSION WITH GOAL BLOOD PRESSURE LESS THAN 140/90: ICD-10-CM

## 2020-06-09 DIAGNOSIS — E11.21 TYPE 2 DIABETES WITH NEPHROPATHY (HCC): ICD-10-CM

## 2020-06-09 DIAGNOSIS — E11.65 TYPE 2 DIABETES MELLITUS WITH HYPERGLYCEMIA, WITH LONG-TERM CURRENT USE OF INSULIN (HCC): ICD-10-CM

## 2020-06-09 DIAGNOSIS — Z79.4 TYPE 2 DIABETES MELLITUS WITHOUT COMPLICATION, WITH LONG-TERM CURRENT USE OF INSULIN (HCC): Primary | ICD-10-CM

## 2020-06-09 RX ORDER — LINACLOTIDE 290 UG/1
CAPSULE, GELATIN COATED ORAL
COMMUNITY
Start: 2020-05-30

## 2020-06-09 RX ORDER — ALBUTEROL SULFATE 90 UG/1
AEROSOL, METERED RESPIRATORY (INHALATION)
COMMUNITY
Start: 2020-05-20 | End: 2021-03-10

## 2020-06-09 RX ORDER — ASPIRIN 81 MG/1
81 TABLET ORAL DAILY
COMMUNITY

## 2020-06-09 RX ORDER — SEMAGLUTIDE 1.34 MG/ML
1 INJECTION, SOLUTION SUBCUTANEOUS
Qty: 1 BOX | Refills: 2 | Status: SHIPPED | OUTPATIENT
Start: 2020-06-09 | End: 2021-03-10 | Stop reason: SDUPTHER

## 2020-06-09 RX ORDER — ACETAZOLAMIDE 250 MG/1
250 TABLET ORAL DAILY
COMMUNITY
Start: 2020-03-06

## 2020-06-09 RX ORDER — ALBUTEROL SULFATE 90 UG/1
2 AEROSOL, METERED RESPIRATORY (INHALATION)
COMMUNITY
Start: 2019-04-16 | End: 2021-03-10

## 2020-06-09 RX ORDER — OLMESARTAN MEDOXOMIL 20 MG/1
TABLET ORAL
COMMUNITY
Start: 2020-03-26

## 2020-06-09 NOTE — PROGRESS NOTES
Jluis Tapia MD                Patient Information  Date:6/9/2020  Name : Marina Scanlon 64 y.o.     YOB: 1960           Pursuant to the emergency declaration under the [facility], [facility] waiver authority and the [employer] Act, this Virtual  Visit was conducted, with patient's consent, to reduce the patient's risk of exposure to COVID-19 . Patient  is aware that this is a billable encounter and is responsible for copays/deductibles       Services were provided through a video synchronous discussion virtually to substitute for in-person clinic visit. Place of service: Provider : Office  Patient: Home      Chief Complaint   Patient presents with    Diabetes       History of Present Illness: Marina Scanlon is a 64 y.o. female here for followupof  Type 2 Diabetes Mellitus. Type 2 Diabetes was diagnosed 24 years  . End organ effects of diabetes: peripheral neuropathy.       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[place]  Appetite suppression lasts only 3 days with  On Ozempic  After 3 days of Ozempic she needs NovoLog 10 units before meals    Seen nephrologist and was diagnosed with stage II kidney disease        Wt Readings from Last 3 Encounters:   07/09/19 259 lb (117.5 kg)   04/04/19 263 lb (119.3 kg)   11/16/18 255 lb (115.7 kg)       BP Readings from Last 3 Encounters:   07/09/19 (!) 188/93   04/04/19 133/74   11/16/18 187/89           Past Medical History:   Diagnosis Date    Diabetes mellitus, type II ([facility] [address] 1993    Hyperlipidemia     Hypertension     Stage 2 chronic kidney disease      Current Outpatient Medications   Medication Sig    albuterol (PROVENTIL HFA, VENTOLIN HFA, PROAIR HFA) 90 mcg/actuation inhaler INHEALE 1 PUFF EVERY 6 HOURS AS NEEDED    albuterol (PROVENTIL HFA, VENTOLIN HFA, PROAIR HFA) 90 mcg/actuation inhaler Take 2 Puffs by inhalation.  olmesartan (BENICAR) 20 mg tablet TAKE 1 TABLET BY MOUTH EVERY DAY    acetaZOLAMIDE (DIAMOX) 250 mg tablet Take 250 mg by mouth daily.  Linzess 290 mcg cap capsule TAKE 1 CAPSULE BY MOUTH EVERY DAY BEFORE BREAKFAST    aspirin delayed-release 81 mg tablet Take 81 mg by mouth daily.  NovoLOG U-100 Insulin aspart 100 unit/mL injection 20 UNITS BEFORE LUNCH AND DINNER W/ SSI MAX UNITS DAILY: 100    insulin glargine (BASAGLAR KWIKPEN U-100 INSULIN) 100 unit/mL (3 mL) inpn 50 Units by SubCUTAneous route two (2) times a day.  ITZEL PEN NEEDLE 32 gauge x 5/32\" ndle USE TO INJECT BASAGLAR DAILY    atenolol (TENORMIN) 25 mg tablet Take 1 Tab by mouth every morning.  semaglutide (OZEMPIC) 1 mg/dose (2 mg/1.5 mL) sub-q pen 1 mg by SubCUTAneous route every seven (7) days.  glucose blood VI test strips (ACCU-CHEK CARLOS PLUS TEST STRP) strip Test TID Dx Code: E11.65    Lancets (ACCU-CHEK FASTCLIX) misc Test TID Dx Code: E11.65    atorvastatin (LIPITOR) 40 mg tablet Take 40 mg by mouth daily.  chlorthalidone (HYGROTEN) 25 mg tablet TAKE 1 TABLET BY MOUTH EVERY DAY    diltiazem CD (CARDIZEM CD) 300 mg ER capsule TAKE 1 CAP BY MOUTH ONCE A DAY.  ASPIRIN/SALICYLAMIDE/CAFFEINE (BC HEADACHE POWDER PO) Take 1 Tab by mouth as needed.  metFORMIN (GLUCOPHAGE) 1,000 mg tablet Take 1 Tab by mouth two (2) times daily (with meals).  fenofibrate nanocrystallized (TRICOR) 145 mg tablet TAKE 1 TABET BY MOUTH EVERY DAY    losartan (COZAAR) 100 mg tablet TAKE 1 TABLET BY MOUTH EVERY DAY     No current facility-administered medications for this visit.       Allergies   Allergen Reactions    Lisinopril Cough and Angioedema     \"lungs shut down\"  Pt confirms she do not have an Epi Pen prescribed to date - 12/11/18         Review of Systems:  -   - Per HPI    Physical Examination:   There were no vitals taken for this visit. Estimated body mass index is 45.88 kg/m² as calculated from the following:    Height as of 7/9/19: 5' 3\" (1.6 m). -   Weight as of 7/9/19: 259 lb (117.5 kg). - General: pleasant, no distress, good eye contact  - HEENT: no exophthalmos, no periorbital edema, EOMI  - Neck: No visible thyromegaly  - RS: Normal respiratory effort  - Musculoskeletal: no tremors  - Neurological: alert and oriented  - Psychiatric: normal mood and affect  - Skin: Normal color    Diabetic foot exam: April 2019     Left:     Vibratory sensation absent   Filament test decreased sensation with micro filament   Pulse DP: 1+    Deformities: None  Right:    Vibratory sensation absent   Filament test decreased sensation with micro filament   Pulse DP: 1+   Deformities: None    Data Reviewed:     Lab Results   Component Value Date/Time    Hemoglobin A1c 7.9 (H) 07/09/2019 11:12 AM    Hemoglobin A1c 10.3 (H) 03/19/2019 10:10 AM    Glucose 362 (H) 03/19/2019 10:10 AM    Glucose  11/16/2018 11:25 AM    Microalb/Creat ratio (ug/mg creat.) 2,954.6 (H) 03/19/2019 10:10 AM    LDL,Direct 162 (H) 03/12/2018 11:28 AM    LDL, calculated 133 (H) 03/19/2019 10:10 AM    Creatinine 1.46 (H) 03/19/2019 10:10 AM      Lab Results   Component Value Date/Time    GFR est non-AA 40 (L) 03/19/2019 10:10 AM    GFR est AA 46 (L) 03/19/2019 10:10 AM    Creatinine 1.46 (H) 03/19/2019 10:10 AM    BUN 30 (H) 03/19/2019 10:10 AM    Sodium 136 03/19/2019 10:10 AM    Potassium 4.4 03/19/2019 10:10 AM    Chloride 99 03/19/2019 10:10 AM    CO2 22 03/19/2019 10:10 AM         Assessment/Plan:     1. Type 2 diabetes mellitus without complication, with long-term current use of insulin ([address]    2. Essential hypertension with goal blood pressure less than 140/90    3. Mixed hyperlipidemia        1.  Type 2 Diabetes Mellitus with neuropathy,  Lab Results   Component Value Date/Time    Hemoglobin A1c 7.9 (H) 07/09/2019 11:12 AM    Hemoglobin A1c (POC) 9.1 11/16/2018 11:25 AM      1 mg Ozempic   Basaglar 50 units twice daily   Metformin - off, I do not have recent labs, she will get the labs from nephrology office, if GFR is consistently less than 30 will hold off  Novolog 10  units BID,   discussed about the diet, need data to adjust insulin    2. HTN : Very high, discussed about the risk  Atenolol,    3. Hyperlipidemia : Continue statin. 4.Obesity:There is no height or weight on file to calculate BMI. Discussed about the importance of exercise and carbohydrate portion control. 5.  Peripheral edema: Has underlying CKD, low-salt diet  Compressive stockings    6 CKD     Risk of hypoglycemia is high due to underlying comorbidities, CKD, high-dose insulin, discussed about checking the blood glucose to avoid the risk    There are no Patient Instructions on file for this visit. Thank you for allowing me to participate in the care of this patient. Christo Moralez MD      Patient verbalized understanding     Voice-recognition software was used to generate this report, which may result in some phonetic-based errors in the grammar and contents. Even though attempts were made to correct all the mistakes, some may have been missed and remained in the body of the report.

## 2020-06-09 NOTE — PROGRESS NOTES
Sabas Briceño is a 64 y.o. female here for   Chief Complaint   Patient presents with    Diabetes     Pt consented to virtual visit. 1. Have you been to the ER, urgent care clinic since your last visit? Hospitalized since your last visit? -[facility] 4/26 for SOB and UTI/ dehydration    2. Have you seen or consulted any other health care providers outside of the [address] [place] [address] since your last visit?   Include any pap smears or colon screening.-Dr. Cathy Luna    DX w/ Stage 2 kidney disease

## 2020-06-09 NOTE — PATIENT INSTRUCTIONS
Basaglar 50 units twice a day     Metformin 1000 mg twice day      Ozempic 1 mg     Novolog 10 units before lunch and 10  units before dinner     Additional Novolog for high sugars     Blood sugar  Breakfast/Lunch/Dinner         150-200  Add  4  Units       201-250  Add  8 Units       251-300  Add  12 Units       301-350  Add 16  Units        351-400  Add 20  Units

## 2020-09-10 DIAGNOSIS — Z79.4 TYPE 2 DIABETES MELLITUS WITH HYPERGLYCEMIA, WITH LONG-TERM CURRENT USE OF INSULIN (HCC): ICD-10-CM

## 2020-09-10 DIAGNOSIS — E11.65 TYPE 2 DIABETES MELLITUS WITH HYPERGLYCEMIA, WITH LONG-TERM CURRENT USE OF INSULIN (HCC): ICD-10-CM

## 2020-09-10 RX ORDER — ATENOLOL 25 MG/1
25 TABLET ORAL
Qty: 90 TAB | Refills: 3 | Status: SHIPPED | OUTPATIENT
Start: 2020-09-10 | End: 2021-03-10

## 2020-10-01 DIAGNOSIS — E78.2 MIXED HYPERLIPIDEMIA: ICD-10-CM

## 2020-10-01 DIAGNOSIS — Z79.4 TYPE 2 DIABETES MELLITUS WITHOUT COMPLICATION, WITH LONG-TERM CURRENT USE OF INSULIN (HCC): ICD-10-CM

## 2020-10-01 DIAGNOSIS — E11.21 TYPE 2 DIABETES WITH NEPHROPATHY (HCC): ICD-10-CM

## 2020-10-01 DIAGNOSIS — I10 ESSENTIAL HYPERTENSION WITH GOAL BLOOD PRESSURE LESS THAN 140/90: ICD-10-CM

## 2020-10-01 DIAGNOSIS — E11.9 TYPE 2 DIABETES MELLITUS WITHOUT COMPLICATION, WITH LONG-TERM CURRENT USE OF INSULIN (HCC): ICD-10-CM

## 2021-03-10 ENCOUNTER — OFFICE VISIT (OUTPATIENT)
Dept: ENDOCRINOLOGY | Age: 61
End: 2021-03-10
Payer: COMMERCIAL

## 2021-03-10 VITALS
WEIGHT: 254 LBS | HEART RATE: 89 BPM | OXYGEN SATURATION: 99 % | BODY MASS INDEX: 45 KG/M2 | RESPIRATION RATE: 18 BRPM | HEIGHT: 63 IN | DIASTOLIC BLOOD PRESSURE: 71 MMHG | TEMPERATURE: 97.4 F | SYSTOLIC BLOOD PRESSURE: 141 MMHG

## 2021-03-10 DIAGNOSIS — I10 ESSENTIAL HYPERTENSION: ICD-10-CM

## 2021-03-10 DIAGNOSIS — E11.65 TYPE 2 DIABETES MELLITUS WITH HYPERGLYCEMIA, WITH LONG-TERM CURRENT USE OF INSULIN (HCC): Primary | ICD-10-CM

## 2021-03-10 DIAGNOSIS — Z79.4 TYPE 2 DIABETES MELLITUS WITH HYPERGLYCEMIA, WITH LONG-TERM CURRENT USE OF INSULIN (HCC): ICD-10-CM

## 2021-03-10 DIAGNOSIS — E11.65 TYPE 2 DIABETES MELLITUS WITH HYPERGLYCEMIA, WITH LONG-TERM CURRENT USE OF INSULIN (HCC): ICD-10-CM

## 2021-03-10 DIAGNOSIS — E11.65 UNCONTROLLED TYPE 2 DIABETES MELLITUS WITH HYPERGLYCEMIA, WITH LONG-TERM CURRENT USE OF INSULIN (HCC): ICD-10-CM

## 2021-03-10 DIAGNOSIS — E78.2 MIXED HYPERLIPIDEMIA: ICD-10-CM

## 2021-03-10 DIAGNOSIS — Z79.4 UNCONTROLLED TYPE 2 DIABETES MELLITUS WITH HYPERGLYCEMIA, WITH LONG-TERM CURRENT USE OF INSULIN (HCC): ICD-10-CM

## 2021-03-10 DIAGNOSIS — Z79.4 TYPE 2 DIABETES MELLITUS WITH HYPERGLYCEMIA, WITH LONG-TERM CURRENT USE OF INSULIN (HCC): Primary | ICD-10-CM

## 2021-03-10 LAB — HBA1C MFR BLD HPLC: 7.8 %

## 2021-03-10 PROCEDURE — 99215 OFFICE O/P EST HI 40 MIN: CPT | Performed by: INTERNAL MEDICINE

## 2021-03-10 PROCEDURE — 3051F HG A1C>EQUAL 7.0%<8.0%: CPT | Performed by: INTERNAL MEDICINE

## 2021-03-10 PROCEDURE — 83036 HEMOGLOBIN GLYCOSYLATED A1C: CPT | Performed by: INTERNAL MEDICINE

## 2021-03-10 RX ORDER — HYDRALAZINE HYDROCHLORIDE 25 MG/1
25 TABLET, FILM COATED ORAL 3 TIMES DAILY
COMMUNITY

## 2021-03-10 RX ORDER — INSULIN ASPART 100 [IU]/ML
INJECTION, SOLUTION INTRAVENOUS; SUBCUTANEOUS
Qty: 10 ML | Refills: 35 | Status: SHIPPED | OUTPATIENT
Start: 2021-03-10 | End: 2021-03-10 | Stop reason: SDUPTHER

## 2021-03-10 RX ORDER — LANCETS
EACH MISCELLANEOUS
Qty: 100 EACH | Refills: 11 | Status: SHIPPED | OUTPATIENT
Start: 2021-03-10

## 2021-03-10 RX ORDER — PEN NEEDLE, DIABETIC 31 GX3/16"
NEEDLE, DISPOSABLE MISCELLANEOUS
Qty: 100 PEN NEEDLE | Refills: 11 | Status: SHIPPED | OUTPATIENT
Start: 2021-03-10

## 2021-03-10 RX ORDER — INSULIN GLARGINE 100 [IU]/ML
50 INJECTION, SOLUTION SUBCUTANEOUS 2 TIMES DAILY
Qty: 30 ML | Refills: 11 | Status: SHIPPED | OUTPATIENT
Start: 2021-03-10 | End: 2021-06-14 | Stop reason: SDUPTHER

## 2021-03-10 RX ORDER — PREDNISOLONE ACETATE 10 MG/ML
1 SUSPENSION/ DROPS OPHTHALMIC 2 TIMES DAILY
COMMUNITY

## 2021-03-10 RX ORDER — BLOOD-GLUCOSE METER
EACH MISCELLANEOUS
Qty: 1 EACH | Refills: 0 | Status: SHIPPED | OUTPATIENT
Start: 2021-03-10

## 2021-03-10 RX ORDER — BLOOD SUGAR DIAGNOSTIC
STRIP MISCELLANEOUS
Qty: 100 STRIP | Refills: 11 | Status: SHIPPED | OUTPATIENT
Start: 2021-03-10

## 2021-03-10 RX ORDER — INSULIN ASPART 100 [IU]/ML
INJECTION, SOLUTION INTRAVENOUS; SUBCUTANEOUS
Qty: 20 ML | Refills: 11 | Status: SHIPPED | OUTPATIENT
Start: 2021-03-10

## 2021-03-10 RX ORDER — SEMAGLUTIDE 1.34 MG/ML
1 INJECTION, SOLUTION SUBCUTANEOUS
Qty: 1 BOX | Refills: 3 | Status: SHIPPED | OUTPATIENT
Start: 2021-03-10 | End: 2021-06-14 | Stop reason: SDUPTHER

## 2021-03-10 NOTE — PATIENT INSTRUCTIONS
Basaglar 50 units twice a day      Ozempic 1 mg     Novolog 5 - 10 units before lunch and 10  units before dinner     Additional Novolog for high sugars     Blood sugar  Breakfast/Lunch/Dinner         150-200  Add  4  Units       201-250  Add  8 Units       251-300  Add  12 Units       301-350  Add 16  Units        351-400  Add 20  Units

## 2021-03-10 NOTE — PROGRESS NOTES
Brock Rodriguez is a 64 y.o. female here for   Chief Complaint   Patient presents with    Diabetes       1. Have you been to the ER, urgent care clinic since your last visit? Hospitalized since your last visit? -no    2. Have you seen or consulted any other health care providers outside of the [address] [place] [address] since your last visit?   Include any pap smears or colon screening.-[facility]

## 2021-03-10 NOTE — LETTER
3/10/2021 Patient: Jewel Nagel YOB: 1960 Date of Visit: 3/10/2021 Kerry Kawasaki, NP 
[address] [facility] [address] Via Fax: [phone] Dear Kerry Kawasaki, NP, Thank you for referring Ms. Mariella Hoskins to [address] [place] [address] for evaluation. My notes for this consultation are attached. If you have questions, please do not hesitate to call me. I look forward to following your patient along with you. Sincerely, Tk Santana MD

## 2021-03-10 NOTE — PROGRESS NOTES
Miguel Rodriguez MD                Patient Information  Date:3/10/2021  Name : Melissa Chan 64 y.o.     YOB: 1960             Chief Complaint   Patient presents with    Diabetes       History of Present Illness: Melissa Chan is a 64 y.o. female here for followupof  Type 2 Diabetes Mellitus. Type 2 Diabetes was diagnosed 24 years  . End organ effects of diabetes: peripheral neuropathy. Last visit was 8 months ago  Follow-up with me is not as recommended  Did not bring the meter or the logbook  She is taking NovoLog only based on the scale, not 10 units plus sliding scale  No recent labs  Followed by nephrology for CKD    She is a RN at [employer]  Appetite suppression lasts only 3 days with On Ozempic  After 3 days of Ozempic she needs NovoLog    No chest pain        Wt Readings from Last 3 Encounters:   03/10/21 254 lb (115.2 kg)   07/09/19 259 lb (117.5 kg)   04/04/19 263 lb (119.3 kg)       BP Readings from Last 3 Encounters:   03/10/21 (!) 141/71   07/09/19 (!) 188/93   04/04/19 133/74           Past Medical History:   Diagnosis Date    Diabetes mellitus, type II ([facility] [address] 1993    Hyperlipidemia     Hypertension     Stage 2 chronic kidney disease      Current Outpatient Medications   Medication Sig    ketorolac, PF, (ACUVAIL) 0.45 % dpet ophthalmic solution Administer 1 Drop to right eye two (2) times a day.  prednisoLONE acetate (PRED FORTE) 1 % ophthalmic suspension Administer 1 Drop to right eye two (2) times a day.  atenoloL (TENORMIN) 25 mg tablet Take 1 Tab by mouth every morning.  olmesartan (BENICAR) 20 mg tablet TAKE 1 TABLET BY MOUTH EVERY DAY    acetaZOLAMIDE (DIAMOX) 250 mg tablet Take 250 mg by mouth daily.  Linzess 290 mcg cap capsule TAKE 1 CAPSULE BY MOUTH EVERY DAY BEFORE BREAKFAST    aspirin delayed-release 81 mg tablet Take 81 mg by mouth daily.     semaglutide (Ozempic) 1 mg/dose (2 mg/1.5 mL) sub-q pen 1 mg by SubCUTAneous route every seven (7) days.  NovoLOG U-100 Insulin aspart 100 unit/mL injection 20 UNITS BEFORE LUNCH AND DINNER W/ SSI MAX UNITS DAILY: 100 (Patient taking differently: SSI Max units daily: 100)    insulin glargine (BASAGLAR KWIKPEN U-100 INSULIN) 100 unit/mL (3 mL) inpn 50 Units by SubCUTAneous route two (2) times a day.  ITZEL PEN NEEDLE 32 gauge x 5/32\" ndle USE TO INJECT BASAGLAR DAILY    glucose blood VI test strips (ACCU-CHEK CARLOS PLUS TEST STRP) strip Test TID Dx Code: E11.65    Lancets (ACCU-CHEK FASTCLIX) misc Test TID Dx Code: E11.65    atorvastatin (LIPITOR) 40 mg tablet Take 40 mg by mouth daily.  diltiazem CD (CARDIZEM CD) 300 mg ER capsule TAKE 1 CAP BY MOUTH ONCE A DAY.  ASPIRIN/SALICYLAMIDE/CAFFEINE (BC HEADACHE POWDER PO) Take 1 Tab by mouth as needed.  albuterol (PROVENTIL HFA, VENTOLIN HFA, PROAIR HFA) 90 mcg/actuation inhaler INHEALE 1 PUFF EVERY 6 HOURS AS NEEDED    albuterol (PROVENTIL HFA, VENTOLIN HFA, PROAIR HFA) 90 mcg/actuation inhaler Take 2 Puffs by inhalation.  metFORMIN (GLUCOPHAGE) 1,000 mg tablet Take 1 Tab by mouth two (2) times daily (with meals).  chlorthalidone (HYGROTEN) 25 mg tablet TAKE 1 TABLET BY MOUTH EVERY DAY    fenofibrate nanocrystallized (TRICOR) 145 mg tablet TAKE 1 TABET BY MOUTH EVERY DAY    losartan (COZAAR) 100 mg tablet TAKE 1 TABLET BY MOUTH EVERY DAY     No current facility-administered medications for this visit. Allergies   Allergen Reactions    Lisinopril Cough and Angioedema     \"lungs shut down\"  Pt confirms she do not have an Epi Pen prescribed to date - 12/11/18         Review of Systems:  -   - Per HPI    Physical Examination:   Blood pressure (!) 141/71, pulse 89, temperature 97.4 °F (36.3 °C), temperature source Oral, resp. rate 18, height 5' 3\" (1.6 m), weight 254 lb (115.2 kg), SpO2 99 %.  Estimated body mass index is 44.99 kg/m² as calculated from the following:    Height as of this encounter: 5' 3\" (1.6 m). -   Weight as of this encounter: 254 lb (115.2 kg). - General: pleasant, no distress, good eye contact  - HEENT: no exophthalmos, no periorbital edema, EOMI  - Neck: No thyromegaly  - CVS: S1-S2 regular  - RS: Normal respiratory effort  - Musculoskeletal: no tremors  - Neurological: alert and oriented  - Psychiatric: normal mood and affect  - Skin: Normal color          Data Reviewed:     Lab Results   Component Value Date/Time    Hemoglobin A1c 7.9 (H) 07/09/2019 11:12 AM    Hemoglobin A1c 10.3 (H) 03/19/2019 10:10 AM    Glucose 362 (H) 03/19/2019 10:10 AM    Glucose  11/16/2018 11:25 AM    Microalb/Creat ratio (ug/mg creat.) 2,954.6 (H) 03/19/2019 10:10 AM    LDL,Direct 162 (H) 03/12/2018 11:28 AM    LDL, calculated 133 (H) 03/19/2019 10:10 AM    Creatinine 1.46 (H) 03/19/2019 10:10 AM      Lab Results   Component Value Date/Time    GFR est non-AA 40 (L) 03/19/2019 10:10 AM    GFR est AA 46 (L) 03/19/2019 10:10 AM    Creatinine 1.46 (H) 03/19/2019 10:10 AM    BUN 30 (H) 03/19/2019 10:10 AM    Sodium 136 03/19/2019 10:10 AM    Potassium 4.4 03/19/2019 10:10 AM    Chloride 99 03/19/2019 10:10 AM    CO2 22 03/19/2019 10:10 AM         Assessment/Plan:     1. Type 2 diabetes mellitus with hyperglycemia, with long-term current use of insulin (HCC)        1. Type 2 Diabetes Mellitus with neuropathy,nephropathy, retinopathy   Lab Results   Component Value Date/Time    Hemoglobin A1c 7.9 (H) 07/09/2019 11:12 AM    Hemoglobin A1c (POC) 9.1 11/16/2018 11:25 AM   Uncontrolled, severe insulin resistance, lifestyle changes stressed   1 mg Ozempic   Basaglar 50 units twice daily  She is off metformin, if GFR is more than 30 a smaller dose might help with insulin resistance. I do not have labs, she wants to get labs at nephrology office which is due soon and will get us a copy  Novolog 10  units BID, lunch and dinner  Without the glucose data it is not safe to adjust the insulin    2.   HTN : Managed by nephrology    3. Hyperlipidemia : Continue statin. 4.Obesity:Body mass index is 44.99 kg/m². Discussed about the importance of exercise and carbohydrate portion control. 5.  Peripheral edema: Has underlying CKD, low-salt diet  Compressive stockings    6 CKD: Followed by nephrology    Risk of hypoglycemia is high due to underlying comorbidities, CKD, high-dose insulin, discussed about checking the blood glucose to avoid the risk    Spent > 40 minutes on the day of the visit reviewing chart, examining, ordering/reviewing labs, counseling, discussing therapeutics and documentation in the medical record    There are no Patient Instructions on file for this visit. Thank you for allowing me to participate in the care of this patient. Joseph Fuchs MD      Patient verbalized understanding     Voice-recognition software was used to generate this report, which may result in some phonetic-based errors in the grammar and contents. Even though attempts were made to correct all the mistakes, some may have been missed and remained in the body of the report.

## 2021-06-14 DIAGNOSIS — Z79.4 UNCONTROLLED TYPE 2 DIABETES MELLITUS WITH HYPERGLYCEMIA, WITH LONG-TERM CURRENT USE OF INSULIN (HCC): ICD-10-CM

## 2021-06-14 DIAGNOSIS — E11.65 TYPE 2 DIABETES MELLITUS WITH HYPERGLYCEMIA, WITH LONG-TERM CURRENT USE OF INSULIN (HCC): ICD-10-CM

## 2021-06-14 DIAGNOSIS — E11.65 UNCONTROLLED TYPE 2 DIABETES MELLITUS WITH HYPERGLYCEMIA, WITH LONG-TERM CURRENT USE OF INSULIN (HCC): ICD-10-CM

## 2021-06-14 DIAGNOSIS — Z79.4 TYPE 2 DIABETES MELLITUS WITH HYPERGLYCEMIA, WITH LONG-TERM CURRENT USE OF INSULIN (HCC): ICD-10-CM

## 2021-06-14 RX ORDER — SEMAGLUTIDE 1.34 MG/ML
1 INJECTION, SOLUTION SUBCUTANEOUS
Qty: 1 BOX | Refills: 11 | Status: SHIPPED | OUTPATIENT
Start: 2021-06-14

## 2021-06-14 RX ORDER — INSULIN GLARGINE 100 [IU]/ML
50 INJECTION, SOLUTION SUBCUTANEOUS 2 TIMES DAILY
Qty: 30 ML | Refills: 11 | Status: SHIPPED | OUTPATIENT
Start: 2021-06-14

## 2021-06-14 NOTE — TELEPHONE ENCOUNTER
----- Message from Daysi Collins sent at 6/14/2021  1:15 PM EDT -----  Regarding:  Joanne Cabral MD, refill  Caller (if not patient):Relationship of caller (if not patient):Best contact number(s): [phone] of medication and dosage if known: insulin glargine (Basaglar KwikPen U-100 Insulin) 100 unit/mL (3 mL) inpn , semaglutide (Ozempic) 1 mg/dose (2 mg/1.5 mL) sub-q Is patient out of this medication (yes/no): noPharmacy name: [facility] listed in chart? (yes/no): yesPharmacy phone number: Phone:  [phone]  Fax:  [phone] Date of last visit: 3/10/21Details to clarify the request:

## 2021-06-26 RX ORDER — SEMAGLUTIDE 1.34 MG/ML
INJECTION, SOLUTION SUBCUTANEOUS
Qty: 1 BOX | Refills: 6 | Status: SHIPPED | OUTPATIENT
Start: 2021-06-26

## 2022-03-18 PROBLEM — E11.21 TYPE 2 DIABETES WITH NEPHROPATHY (HCC): Status: ACTIVE | Noted: 2018-07-30

## 2022-03-18 PROBLEM — E08.40 DIABETES MELLITUS DUE TO UNDERLYING CONDITION WITH DIABETIC NEUROPATHY, WITH LONG-TERM CURRENT USE OF INSULIN (HCC): Status: ACTIVE | Noted: 2018-03-12

## 2022-03-18 PROBLEM — Z79.4 DIABETES MELLITUS DUE TO UNDERLYING CONDITION WITH DIABETIC NEUROPATHY, WITH LONG-TERM CURRENT USE OF INSULIN (HCC): Status: ACTIVE | Noted: 2018-03-12

## 2022-03-19 PROBLEM — E66.01 OBESITY, MORBID (HCC): Status: ACTIVE | Noted: 2018-03-12

## 2023-05-18 RX ORDER — PREDNISOLONE ACETATE 10 MG/ML
1 SUSPENSION/ DROPS OPHTHALMIC 2 TIMES DAILY
COMMUNITY

## 2023-05-18 RX ORDER — ATORVASTATIN CALCIUM 40 MG/1
40 TABLET, FILM COATED ORAL DAILY
COMMUNITY
Start: 2016-07-14

## 2023-05-18 RX ORDER — SEMAGLUTIDE 1.34 MG/ML
INJECTION, SOLUTION SUBCUTANEOUS
COMMUNITY
Start: 2021-06-26

## 2023-05-18 RX ORDER — INSULIN GLARGINE 100 [IU]/ML
50 INJECTION, SOLUTION SUBCUTANEOUS 2 TIMES DAILY
COMMUNITY
Start: 2021-06-14

## 2023-05-18 RX ORDER — SEMAGLUTIDE 1.34 MG/ML
1 INJECTION, SOLUTION SUBCUTANEOUS
COMMUNITY
Start: 2021-06-14

## 2023-05-18 RX ORDER — ASPIRIN 81 MG/1
81 TABLET ORAL DAILY
COMMUNITY

## 2023-05-18 RX ORDER — INSULIN ASPART 100 [IU]/ML
INJECTION, SOLUTION INTRAVENOUS; SUBCUTANEOUS
COMMUNITY
Start: 2021-03-10

## 2023-05-18 RX ORDER — HYDRALAZINE HYDROCHLORIDE 25 MG/1
25 TABLET, FILM COATED ORAL 3 TIMES DAILY
COMMUNITY

## 2023-05-18 RX ORDER — OLMESARTAN MEDOXOMIL 20 MG/1
1 TABLET ORAL DAILY
COMMUNITY
Start: 2020-03-26

## 2023-05-18 RX ORDER — ACETAZOLAMIDE 250 MG/1
250 TABLET ORAL DAILY
COMMUNITY
Start: 2020-03-06

## 2023-05-18 RX ORDER — DILTIAZEM HYDROCHLORIDE 300 MG/1
CAPSULE, EXTENDED RELEASE ORAL
COMMUNITY
Start: 2016-09-17

## 2023-05-18 RX ORDER — LINACLOTIDE 290 UG/1
CAPSULE, GELATIN COATED ORAL
COMMUNITY
Start: 2020-05-30

## 2025-01-20 ENCOUNTER — HOSPITAL ENCOUNTER (OUTPATIENT)
Facility: HOSPITAL | Age: 65
Discharge: HOME | End: 2025-01-23
Payer: COMMERCIAL

## 2025-01-20 VITALS
HEIGHT: 63 IN | BODY MASS INDEX: 42.77 KG/M2 | SYSTOLIC BLOOD PRESSURE: 169 MMHG | WEIGHT: 241.4 LBS | TEMPERATURE: 98.3 F | DIASTOLIC BLOOD PRESSURE: 66 MMHG | HEART RATE: 86 BPM | OXYGEN SATURATION: 100 % | RESPIRATION RATE: 16 BRPM

## 2025-01-20 LAB
ANION GAP SERPL CALC-SCNC: 7 MMOL/L (ref 2–12)
BUN SERPL-MCNC: 84 MG/DL (ref 6–20)
BUN/CREAT SERPL: 12 (ref 12–20)
CALCIUM SERPL-MCNC: 8.1 MG/DL (ref 8.5–10.1)
CHLORIDE SERPL-SCNC: 111 MMOL/L (ref 97–108)
CO2 SERPL-SCNC: 20 MMOL/L (ref 21–32)
CREAT SERPL-MCNC: 6.93 MG/DL (ref 0.55–1.02)
ERYTHROCYTE [DISTWIDTH] IN BLOOD BY AUTOMATED COUNT: 13.1 % (ref 11.5–14.5)
GLUCOSE SERPL-MCNC: 216 MG/DL (ref 65–100)
HCT VFR BLD AUTO: 27.8 % (ref 35–47)
HGB BLD-MCNC: 8.9 G/DL (ref 11.5–16)
MCH RBC QN AUTO: 29.2 PG (ref 26–34)
MCHC RBC AUTO-ENTMCNC: 32 G/DL (ref 30–36.5)
MCV RBC AUTO: 91.1 FL (ref 80–99)
NRBC # BLD: 0 K/UL (ref 0–0.01)
NRBC BLD-RTO: 0 PER 100 WBC
PLATELET # BLD AUTO: 280 K/UL (ref 150–400)
PMV BLD AUTO: 10.5 FL (ref 8.9–12.9)
POTASSIUM SERPL-SCNC: 5 MMOL/L (ref 3.5–5.1)
RBC # BLD AUTO: 3.05 M/UL (ref 3.8–5.2)
SODIUM SERPL-SCNC: 138 MMOL/L (ref 136–145)
WBC # BLD AUTO: 7.1 K/UL (ref 3.6–11)

## 2025-01-20 PROCEDURE — 36415 COLL VENOUS BLD VENIPUNCTURE: CPT

## 2025-01-20 PROCEDURE — 80048 BASIC METABOLIC PNL TOTAL CA: CPT

## 2025-01-20 PROCEDURE — 85027 COMPLETE CBC AUTOMATED: CPT

## 2025-01-20 PROCEDURE — 93005 ELECTROCARDIOGRAM TRACING: CPT | Performed by: ANESTHESIOLOGY

## 2025-01-20 RX ORDER — SODIUM CHLORIDE 9 MG/ML
INJECTION, SOLUTION INTRAVENOUS CONTINUOUS
Status: CANCELLED | OUTPATIENT
Start: 2025-01-24

## 2025-01-20 RX ORDER — CALCITRIOL 0.25 UG/1
0.25 CAPSULE, LIQUID FILLED ORAL DAILY
COMMUNITY

## 2025-01-20 RX ORDER — CEFAZOLIN SODIUM/WATER 2 G/20 ML
2000 SYRINGE (ML) INTRAVENOUS ONCE
Status: CANCELLED | OUTPATIENT
Start: 2025-01-24

## 2025-01-20 RX ORDER — DILTIAZEM HYDROCHLORIDE 240 MG/1
240 CAPSULE, EXTENDED RELEASE ORAL DAILY
COMMUNITY

## 2025-01-20 RX ORDER — VANCOMYCIN HYDROCHLORIDE
1500 ONCE
Status: CANCELLED | OUTPATIENT
Start: 2025-01-24

## 2025-01-20 RX ORDER — SODIUM BICARBONATE 650 MG/1
1300 TABLET ORAL 3 TIMES DAILY
COMMUNITY

## 2025-01-20 RX ORDER — CARVEDILOL 6.25 MG/1
6.25 TABLET ORAL 2 TIMES DAILY WITH MEALS
COMMUNITY

## 2025-01-20 RX ORDER — SEVELAMER CARBONATE 800 MG/1
1 TABLET, FILM COATED ORAL
COMMUNITY

## 2025-01-20 RX ORDER — FUROSEMIDE 40 MG/1
40 TABLET ORAL 2 TIMES DAILY
COMMUNITY

## 2025-01-20 ASSESSMENT — PAIN DESCRIPTION - ORIENTATION: ORIENTATION: RIGHT

## 2025-01-20 ASSESSMENT — PAIN DESCRIPTION - LOCATION: LOCATION: HIP;LEG

## 2025-01-20 ASSESSMENT — PAIN DESCRIPTION - DESCRIPTORS: DESCRIPTORS: ACHING;THROBBING

## 2025-01-20 ASSESSMENT — PAIN SCALES - GENERAL: PAINLEVEL_OUTOF10: 4

## 2025-01-20 NOTE — PROGRESS NOTES
[facility]  Preoperative Instructions        Surgery Date 1/24/25          Time of Arrival to be called 1/23 between 2-5pm to [phone]     On the day of your surgery, please report to Surgical Services Registration Desk and sign in at your designated time.  The Surgery Center is located to the right of the Emergency Room.     2. You must have someone with you to drive you home. You should not drive a car for 24 hours following surgery. Please make arrangements for a friend or family member to stay with you for the first 24 hours after your surgery.    3. Do not have anything to eat or drink (including water, gum, mints, coffee, juice) after midnight 1/23. This may not apply to medications prescribed by your physician. ?(Please note below the special instructions with medications to take the morning of your procedure.)    4. We recommend you do not drink any alcoholic beverages for 24 hours before and after your surgery.    5. Contact your surgeon's office for instructions on the following medications: non-steroidal anti-inflammatory drugs (i.e. Advil, Aleve), vitamins, and supplements. (Some surgeon's will want you to stop these medications prior to surgery and others may allow you to take them)  **If you are currently taking Plavix, Coumadin, Aspirin and/or other blood-thinning agents, contact your surgeon for instructions.** Your surgeon will partner with the physician prescribing these medications to determine if it is safe to stop or if you need to continue taking.  Please do not stop taking these medications without instructions from your surgeon    6. Wear comfortable clothes.  Wear glasses instead of contacts.  Do not bring any money or jewelry. Please bring picture ID, insurance card, and any prearranged co-payment or hospital payment.  Do not wear make-up, particularly mascara the morning of your surgery.  Do not wear nail polish, particularly if you are having foot /hand surgery.

## 2025-01-21 LAB
EKG ATRIAL RATE: 80 BPM
EKG DIAGNOSIS: NORMAL
EKG P AXIS: 51 DEGREES
EKG P-R INTERVAL: 160 MS
EKG Q-T INTERVAL: 398 MS
EKG QRS DURATION: 94 MS
EKG QTC CALCULATION (BAZETT): 459 MS
EKG R AXIS: -31 DEGREES
EKG T AXIS: 13 DEGREES
EKG VENTRICULAR RATE: 80 BPM

## 2025-01-24 ENCOUNTER — ANESTHESIA EVENT (OUTPATIENT)
Facility: HOSPITAL | Age: 65
End: 2025-01-24
Payer: COMMERCIAL

## 2025-01-24 ENCOUNTER — HOSPITAL ENCOUNTER (OUTPATIENT)
Facility: HOSPITAL | Age: 65
Setting detail: OUTPATIENT SURGERY
Discharge: HOME | End: 2025-01-24
Attending: SURGERY | Admitting: SURGERY
Payer: COMMERCIAL

## 2025-01-24 ENCOUNTER — ANESTHESIA (OUTPATIENT)
Facility: HOSPITAL | Age: 65
End: 2025-01-24
Payer: COMMERCIAL

## 2025-01-24 VITALS
WEIGHT: 235.23 LBS | DIASTOLIC BLOOD PRESSURE: 76 MMHG | BODY MASS INDEX: 41.68 KG/M2 | HEART RATE: 77 BPM | TEMPERATURE: 98 F | HEIGHT: 63 IN | SYSTOLIC BLOOD PRESSURE: 162 MMHG | RESPIRATION RATE: 15 BRPM | OXYGEN SATURATION: 98 %

## 2025-01-24 DIAGNOSIS — E11.21 TYPE 2 DIABETES WITH NEPHROPATHY (HCC): Primary | ICD-10-CM

## 2025-01-24 LAB
ANION GAP BLD CALC-SCNC: 3.7 MMOL/L (ref 10–20)
ANION GAP BLD CALC-SCNC: 8.8 MMOL/L (ref 10–20)
ANION GAP SERPL CALC-SCNC: 6 MMOL/L (ref 2–12)
BUN SERPL-MCNC: 76 MG/DL (ref 6–20)
BUN/CREAT SERPL: 11 (ref 12–20)
CA-I BLD-MCNC: 1.07 MMOL/L (ref 1.15–1.33)
CA-I BLD-MCNC: 1.19 MMOL/L (ref 1.15–1.33)
CALCIUM SERPL-MCNC: 8.7 MG/DL (ref 8.5–10.1)
CHLORIDE BLD-SCNC: 113 MMOL/L (ref 98–107)
CHLORIDE BLD-SCNC: 114 MMOL/L (ref 98–107)
CHLORIDE SERPL-SCNC: 114 MMOL/L (ref 97–108)
CO2 BLD-SCNC: 19.3 MMOL/L (ref 21–32)
CO2 BLD-SCNC: 20.2 MMOL/L (ref 21–32)
CO2 SERPL-SCNC: 20 MMOL/L (ref 21–32)
CREAT BLD-MCNC: 5.37 MG/DL (ref 0.6–1.3)
CREAT BLD-MCNC: 5.7 MG/DL (ref 0.6–1.3)
CREAT SERPL-MCNC: 7.12 MG/DL (ref 0.55–1.02)
GLUCOSE BLD STRIP.AUTO-MCNC: 106 MG/DL (ref 65–117)
GLUCOSE BLD-MCNC: 77 MG/DL (ref 74–99)
GLUCOSE BLD-MCNC: 81 MG/DL (ref 74–99)
GLUCOSE SERPL-MCNC: 79 MG/DL (ref 65–100)
POTASSIUM BLD-SCNC: 4.9 MMOL/L (ref 3.5–5.1)
POTASSIUM BLD-SCNC: 8.4 MMOL/L (ref 3.5–5.1)
POTASSIUM SERPL-SCNC: 4.8 MMOL/L (ref 3.5–5.1)
SERVICE CMNT-IMP: ABNORMAL
SERVICE CMNT-IMP: NORMAL
SODIUM BLD-SCNC: 137 MMOL/L (ref 136–145)
SODIUM BLD-SCNC: 142 MMOL/L (ref 136–145)
SODIUM SERPL-SCNC: 140 MMOL/L (ref 136–145)

## 2025-01-24 PROCEDURE — 3600000003 HC SURGERY LEVEL 3 BASE: Performed by: SURGERY

## 2025-01-24 PROCEDURE — 6370000000 HC RX 637 (ALT 250 FOR IP): Performed by: SURGERY

## 2025-01-24 PROCEDURE — 3600000013 HC SURGERY LEVEL 3 ADDTL 15MIN: Performed by: SURGERY

## 2025-01-24 PROCEDURE — 3700000001 HC ADD 15 MINUTES (ANESTHESIA): Performed by: SURGERY

## 2025-01-24 PROCEDURE — C1750 CATH, HEMODIALYSIS,LONG-TERM: HCPCS | Performed by: SURGERY

## 2025-01-24 PROCEDURE — 6360000002 HC RX W HCPCS: Performed by: SURGERY

## 2025-01-24 PROCEDURE — 6360000002 HC RX W HCPCS

## 2025-01-24 PROCEDURE — 2580000003 HC RX 258

## 2025-01-24 PROCEDURE — 7100000000 HC PACU RECOVERY - FIRST 15 MIN: Performed by: SURGERY

## 2025-01-24 PROCEDURE — 3700000000 HC ANESTHESIA ATTENDED CARE: Performed by: SURGERY

## 2025-01-24 PROCEDURE — 2500000003 HC RX 250 WO HCPCS: Performed by: SURGERY

## 2025-01-24 PROCEDURE — 7100000010 HC PHASE II RECOVERY - FIRST 15 MIN: Performed by: SURGERY

## 2025-01-24 PROCEDURE — 82962 GLUCOSE BLOOD TEST: CPT

## 2025-01-24 PROCEDURE — 80048 BASIC METABOLIC PNL TOTAL CA: CPT

## 2025-01-24 PROCEDURE — 36415 COLL VENOUS BLD VENIPUNCTURE: CPT

## 2025-01-24 PROCEDURE — 80047 BASIC METABLC PNL IONIZED CA: CPT

## 2025-01-24 PROCEDURE — 2709999900 HC NON-CHARGEABLE SUPPLY: Performed by: SURGERY

## 2025-01-24 PROCEDURE — 2500000003 HC RX 250 WO HCPCS

## 2025-01-24 PROCEDURE — 7100000001 HC PACU RECOVERY - ADDTL 15 MIN: Performed by: SURGERY

## 2025-01-24 RX ORDER — SODIUM CHLORIDE 0.9 % (FLUSH) 0.9 %
5-40 SYRINGE (ML) INJECTION PRN
Status: DISCONTINUED | OUTPATIENT
Start: 2025-01-24 | End: 2025-01-24 | Stop reason: HOSPADM

## 2025-01-24 RX ORDER — ONDANSETRON 2 MG/ML
4 INJECTION INTRAMUSCULAR; INTRAVENOUS
Status: DISCONTINUED | OUTPATIENT
Start: 2025-01-24 | End: 2025-01-24 | Stop reason: HOSPADM

## 2025-01-24 RX ORDER — VANCOMYCIN 1.5 G/300ML
1500 INJECTION, SOLUTION INTRAVENOUS ONCE
Status: DISCONTINUED | OUTPATIENT
Start: 2025-01-24 | End: 2025-01-24 | Stop reason: HOSPADM

## 2025-01-24 RX ORDER — SODIUM CHLORIDE, SODIUM LACTATE, POTASSIUM CHLORIDE, CALCIUM CHLORIDE 600; 310; 30; 20 MG/100ML; MG/100ML; MG/100ML; MG/100ML
INJECTION, SOLUTION INTRAVENOUS CONTINUOUS
Status: DISCONTINUED | OUTPATIENT
Start: 2025-01-24 | End: 2025-01-24 | Stop reason: HOSPADM

## 2025-01-24 RX ORDER — FENTANYL CITRATE 50 UG/ML
50 INJECTION, SOLUTION INTRAMUSCULAR; INTRAVENOUS EVERY 5 MIN PRN
Status: DISCONTINUED | OUTPATIENT
Start: 2025-01-24 | End: 2025-01-24 | Stop reason: HOSPADM

## 2025-01-24 RX ORDER — SODIUM CHLORIDE 0.9 % (FLUSH) 0.9 %
5-40 SYRINGE (ML) INJECTION EVERY 12 HOURS SCHEDULED
Status: DISCONTINUED | OUTPATIENT
Start: 2025-01-24 | End: 2025-01-24 | Stop reason: HOSPADM

## 2025-01-24 RX ORDER — GLYCOPYRROLATE 0.2 MG/ML
INJECTION INTRAMUSCULAR; INTRAVENOUS
Status: DISCONTINUED | OUTPATIENT
Start: 2025-01-24 | End: 2025-01-24 | Stop reason: SDUPTHER

## 2025-01-24 RX ORDER — HYDRALAZINE HYDROCHLORIDE 20 MG/ML
10 INJECTION INTRAMUSCULAR; INTRAVENOUS
Status: DISCONTINUED | OUTPATIENT
Start: 2025-01-24 | End: 2025-01-24 | Stop reason: HOSPADM

## 2025-01-24 RX ORDER — SUCCINYLCHOLINE CHLORIDE 20 MG/ML
INJECTION INTRAMUSCULAR; INTRAVENOUS
Status: DISCONTINUED | OUTPATIENT
Start: 2025-01-24 | End: 2025-01-24 | Stop reason: SDUPTHER

## 2025-01-24 RX ORDER — ROCURONIUM BROMIDE 10 MG/ML
INJECTION, SOLUTION INTRAVENOUS
Status: DISCONTINUED | OUTPATIENT
Start: 2025-01-24 | End: 2025-01-24 | Stop reason: SDUPTHER

## 2025-01-24 RX ORDER — HYDROMORPHONE HYDROCHLORIDE 1 MG/ML
0.5 INJECTION, SOLUTION INTRAMUSCULAR; INTRAVENOUS; SUBCUTANEOUS EVERY 5 MIN PRN
Status: DISCONTINUED | OUTPATIENT
Start: 2025-01-24 | End: 2025-01-24 | Stop reason: HOSPADM

## 2025-01-24 RX ORDER — SODIUM CHLORIDE 9 MG/ML
INJECTION, SOLUTION INTRAVENOUS
Status: DISCONTINUED | OUTPATIENT
Start: 2025-01-24 | End: 2025-01-24 | Stop reason: SDUPTHER

## 2025-01-24 RX ORDER — SODIUM CHLORIDE 9 MG/ML
INJECTION, SOLUTION INTRAVENOUS CONTINUOUS
Status: DISCONTINUED | OUTPATIENT
Start: 2025-01-24 | End: 2025-01-24 | Stop reason: HOSPADM

## 2025-01-24 RX ORDER — FENTANYL CITRATE 50 UG/ML
INJECTION, SOLUTION INTRAMUSCULAR; INTRAVENOUS
Status: DISCONTINUED | OUTPATIENT
Start: 2025-01-24 | End: 2025-01-24 | Stop reason: SDUPTHER

## 2025-01-24 RX ORDER — DIPHENHYDRAMINE HYDROCHLORIDE 50 MG/ML
12.5 INJECTION INTRAMUSCULAR; INTRAVENOUS
Status: DISCONTINUED | OUTPATIENT
Start: 2025-01-24 | End: 2025-01-24 | Stop reason: HOSPADM

## 2025-01-24 RX ORDER — OXYCODONE HYDROCHLORIDE 5 MG/1
5 TABLET ORAL EVERY 6 HOURS PRN
Qty: 15 TABLET | Refills: 0 | Status: SHIPPED | OUTPATIENT
Start: 2025-01-24 | End: 2025-01-29

## 2025-01-24 RX ORDER — ONDANSETRON 2 MG/ML
INJECTION INTRAMUSCULAR; INTRAVENOUS
Status: DISCONTINUED | OUTPATIENT
Start: 2025-01-24 | End: 2025-01-24 | Stop reason: SDUPTHER

## 2025-01-24 RX ORDER — MIDAZOLAM HYDROCHLORIDE 5 MG/5ML
2 INJECTION, SOLUTION INTRAMUSCULAR; INTRAVENOUS
Status: DISCONTINUED | OUTPATIENT
Start: 2025-01-24 | End: 2025-01-24 | Stop reason: HOSPADM

## 2025-01-24 RX ORDER — DEXAMETHASONE SODIUM PHOSPHATE 4 MG/ML
INJECTION, SOLUTION INTRA-ARTICULAR; INTRALESIONAL; INTRAMUSCULAR; INTRAVENOUS; SOFT TISSUE
Status: DISCONTINUED | OUTPATIENT
Start: 2025-01-24 | End: 2025-01-24 | Stop reason: SDUPTHER

## 2025-01-24 RX ORDER — MIDAZOLAM HYDROCHLORIDE 1 MG/ML
INJECTION, SOLUTION INTRAMUSCULAR; INTRAVENOUS
Status: DISCONTINUED | OUTPATIENT
Start: 2025-01-24 | End: 2025-01-24 | Stop reason: SDUPTHER

## 2025-01-24 RX ORDER — FENTANYL CITRATE 50 UG/ML
100 INJECTION, SOLUTION INTRAMUSCULAR; INTRAVENOUS
Status: DISCONTINUED | OUTPATIENT
Start: 2025-01-24 | End: 2025-01-24 | Stop reason: HOSPADM

## 2025-01-24 RX ORDER — SODIUM CHLORIDE 9 MG/ML
INJECTION, SOLUTION INTRAVENOUS PRN
Status: DISCONTINUED | OUTPATIENT
Start: 2025-01-24 | End: 2025-01-24 | Stop reason: HOSPADM

## 2025-01-24 RX ORDER — LIDOCAINE HYDROCHLORIDE 20 MG/ML
INJECTION, SOLUTION EPIDURAL; INFILTRATION; INTRACAUDAL; PERINEURAL
Status: DISCONTINUED | OUTPATIENT
Start: 2025-01-24 | End: 2025-01-24 | Stop reason: SDUPTHER

## 2025-01-24 RX ORDER — ONDANSETRON 2 MG/ML
4 INJECTION INTRAMUSCULAR; INTRAVENOUS ONCE
Status: DISCONTINUED | OUTPATIENT
Start: 2025-01-24 | End: 2025-01-24 | Stop reason: HOSPADM

## 2025-01-24 RX ORDER — MIDAZOLAM HYDROCHLORIDE 5 MG/5ML
5 INJECTION, SOLUTION INTRAMUSCULAR; INTRAVENOUS
Status: DISCONTINUED | OUTPATIENT
Start: 2025-01-24 | End: 2025-01-24 | Stop reason: HOSPADM

## 2025-01-24 RX ORDER — PROCHLORPERAZINE EDISYLATE 5 MG/ML
5 INJECTION INTRAMUSCULAR; INTRAVENOUS
Status: DISCONTINUED | OUTPATIENT
Start: 2025-01-24 | End: 2025-01-24 | Stop reason: HOSPADM

## 2025-01-24 RX ORDER — EPHEDRINE SULFATE/0.9% NACL/PF 50 MG/5 ML
SYRINGE (ML) INTRAVENOUS
Status: DISCONTINUED | OUTPATIENT
Start: 2025-01-24 | End: 2025-01-24 | Stop reason: SDUPTHER

## 2025-01-24 RX ORDER — NALOXONE HYDROCHLORIDE 0.4 MG/ML
INJECTION, SOLUTION INTRAMUSCULAR; INTRAVENOUS; SUBCUTANEOUS PRN
Status: DISCONTINUED | OUTPATIENT
Start: 2025-01-24 | End: 2025-01-24 | Stop reason: HOSPADM

## 2025-01-24 RX ORDER — OXYCODONE HYDROCHLORIDE 5 MG/1
5 TABLET ORAL
Status: COMPLETED | OUTPATIENT
Start: 2025-01-24 | End: 2025-01-24

## 2025-01-24 RX ADMIN — ONDANSETRON HYDROCHLORIDE 4 MG: 2 INJECTION, SOLUTION INTRAMUSCULAR; INTRAVENOUS at 14:39

## 2025-01-24 RX ADMIN — SODIUM CHLORIDE: 900 INJECTION, SOLUTION INTRAVENOUS at 13:47

## 2025-01-24 RX ADMIN — Medication 5 MG: at 14:32

## 2025-01-24 RX ADMIN — FENTANYL CITRATE 50 MCG: 50 INJECTION, SOLUTION INTRAMUSCULAR; INTRAVENOUS at 13:59

## 2025-01-24 RX ADMIN — LIDOCAINE HYDROCHLORIDE 100 MG: 20 INJECTION, SOLUTION EPIDURAL; INFILTRATION; INTRACAUDAL; PERINEURAL at 13:59

## 2025-01-24 RX ADMIN — MIDAZOLAM HYDROCHLORIDE 2 MG: 1 INJECTION, SOLUTION INTRAMUSCULAR; INTRAVENOUS at 13:46

## 2025-01-24 RX ADMIN — GLYCOPYRROLATE 0.2 MG: 0.2 INJECTION INTRAMUSCULAR; INTRAVENOUS at 14:28

## 2025-01-24 RX ADMIN — FENTANYL CITRATE 25 MCG: 50 INJECTION, SOLUTION INTRAMUSCULAR; INTRAVENOUS at 15:01

## 2025-01-24 RX ADMIN — PHENYLEPHRINE HYDROCHLORIDE 20 MCG/MIN: 10 INJECTION INTRAVENOUS at 14:19

## 2025-01-24 RX ADMIN — WATER 2000 MG: 1 INJECTION INTRAMUSCULAR; INTRAVENOUS; SUBCUTANEOUS at 14:02

## 2025-01-24 RX ADMIN — HYDROMORPHONE HYDROCHLORIDE 0.2 MG: 1 INJECTION, SOLUTION INTRAMUSCULAR; INTRAVENOUS; SUBCUTANEOUS at 14:57

## 2025-01-24 RX ADMIN — Medication 3 AMPULE: at 12:39

## 2025-01-24 RX ADMIN — ROCURONIUM BROMIDE 10 MG: 10 INJECTION INTRAVENOUS at 14:15

## 2025-01-24 RX ADMIN — OXYCODONE HYDROCHLORIDE 5 MG: 5 TABLET ORAL at 16:05

## 2025-01-24 RX ADMIN — FENTANYL CITRATE 50 MCG: 50 INJECTION, SOLUTION INTRAMUSCULAR; INTRAVENOUS at 14:15

## 2025-01-24 RX ADMIN — SUCCINYLCHOLINE CHLORIDE 140 MG: 20 INJECTION, SOLUTION INTRAMUSCULAR; INTRAVENOUS at 14:00

## 2025-01-24 RX ADMIN — PROPOFOL 160 MG: 10 INJECTION, EMULSION INTRAVENOUS at 13:59

## 2025-01-24 RX ADMIN — ROCURONIUM BROMIDE 20 MG: 10 INJECTION INTRAVENOUS at 14:05

## 2025-01-24 RX ADMIN — SUGAMMADEX 250 MG: 100 INJECTION, SOLUTION INTRAVENOUS at 15:06

## 2025-01-24 RX ADMIN — DEXAMETHASONE SODIUM PHOSPHATE 4 MG: 4 INJECTION, SOLUTION INTRAMUSCULAR; INTRAVENOUS at 14:07

## 2025-01-24 ASSESSMENT — PAIN DESCRIPTION - DESCRIPTORS
DESCRIPTORS: SORE
DESCRIPTORS: ACHING
DESCRIPTORS: ACHING

## 2025-01-24 ASSESSMENT — PAIN DESCRIPTION - LOCATION
LOCATION: ABDOMEN
LOCATION: ABDOMEN

## 2025-01-24 ASSESSMENT — PAIN SCALES - GENERAL
PAINLEVEL_OUTOF10: 2
PAINLEVEL_OUTOF10: 5

## 2025-01-24 ASSESSMENT — PAIN DESCRIPTION - PAIN TYPE: TYPE: SURGICAL PAIN

## 2025-01-24 ASSESSMENT — PAIN DESCRIPTION - ORIENTATION
ORIENTATION: ANTERIOR;LEFT
ORIENTATION: ANTERIOR;LEFT

## 2025-01-24 ASSESSMENT — ENCOUNTER SYMPTOMS: SHORTNESS OF BREATH: 1

## 2025-01-24 ASSESSMENT — PAIN - FUNCTIONAL ASSESSMENT: PAIN_FUNCTIONAL_ASSESSMENT: 0-10

## 2025-01-24 NOTE — ANESTHESIA PRE PROCEDURE
• Arthritis    • CAD (coronary artery disease)     non obstructive per cardiology note from cardiac cath in    • Diabetes mellitus, type II ([facility])    • ESRD (end stage renal disease) ([facility])     stage 5 as of 10/2/24 nephrology note-pending PD cath insertion on 25   • Hyperlipidemia    • Hypertension    • Sleep apnea     does not use CPAP   • Tachycardia        Past Surgical History:        Procedure Laterality Date   • CARDIAC CATHETERIZATION  2020   • CATARACT REMOVAL Bilateral 3/1/21, 2/15/21   •  SECTION      x 2 ,    • FOOT SURGERY Left    • RECTAL SURGERY      anal fistula surgery       Social History:    Social History     Tobacco Use   • Smoking status: Former     Current packs/day: 1.00     Types: Cigarettes   • Smokeless tobacco: Never   Substance Use Topics   • Alcohol use: No                                Counseling given: Not Answered      Vital Signs (Current):   Vitals:    25 1150   BP: (!)    Pulse: 85   Resp: 18   Temp: 98.1 °F (36.7 °C)   TempSrc: Oral   SpO2: 100%   Weight: 106.7 kg (235 lb 3.7 oz)   Height: 1.6 m (5' 3\")                                              BP Readings from Last 3 Encounters:   25 (!) 206/84   25 (!) 169/66   03/10/21 (!) 141/71       NPO Status: Time of last liquid consumption: 0800                        Time of last solid consumption: 0000                        Date of last liquid consumption: 25                        Date of last solid food consumption: 25    BMI:   Wt Readings from Last 3 Encounters:   25 106.7 kg (235 lb 3.7 oz)   25 109.5 kg (241 lb 6.5 oz)   03/10/21 115.2 kg (254 lb)     Body mass index is 41.67 kg/m².    CBC:   Lab Results   Component Value Date/Time    WBC 7.1 2025 02:53 PM    RBC 3.05 2025 02:53 PM    HGB 8.9 2025 02:53 PM    HCT 27.8 2025 02:53 PM    MCV 91.1 2025 02:53 PM    RDW 13.1 2025 02:53 PM

## 2025-01-24 NOTE — H&P
Vascular Surgery History & Physical    Subjective:     Jovana Monge is a 64 y.o.  male with ESRD that presents for PD catheter placement     Past Medical History:   Diagnosis Date    Arthritis     CAD (coronary artery disease)     non obstructive per cardiology note from cardiac cath in     Diabetes mellitus, type II (HCC)     ESRD (end stage renal disease) ([facility])     stage 5 as of 10/2/24 nephrology note-pending PD cath insertion on 25    Hyperlipidemia     Hypertension     Sleep apnea     does not use CPAP    Tachycardia       Past Surgical History:   Procedure Laterality Date    CARDIAC CATHETERIZATION  2020    CATARACT REMOVAL Bilateral 3/1/21, 2/15/21     SECTION      x 2 ,     FOOT SURGERY Left     fb removal  glass    RECTAL SURGERY      anal fistula surgery     Family History   Problem Relation Age of Onset    Hypertension Mother     Heart Failure Mother     Heart Disease Father     Diabetes Mother     Hypertension Father     Diabetes Father       Social History     Tobacco Use    Smoking status: Former     Current packs/day: 1.00     Types: Cigarettes    Smokeless tobacco: Never   Substance Use Topics    Alcohol use: No       Prior to Admission medications    Medication Sig Start Date End Date Taking? Authorizing Provider   dilTIAZem (DILACOR XR) 240 MG extended release capsule Take 1 capsule by mouth daily   Yes Russ Kimbrough MD   furosemide (LASIX) 40 MG tablet Take 1 tablet by mouth in the morning and 1 tablet in the evening.   Yes ProviderRuss MD   calcitRIOL (ROCALTROL) 0.25 MCG capsule Take 1 capsule by mouth daily   Yes Russ Kimbrough MD   carvedilol (COREG) 6.25 MG tablet Take 1 tablet by mouth 2 times daily (with meals)   Yes Russ Kimbrough MD   sevelamer (RENVELA) 800 MG tablet Take 1 tablet by mouth 3 times daily (with meals)   Yes Russ Kimbrough MD   sodium bicarbonate 650 MG tablet Take 2

## 2025-01-24 NOTE — OP NOTE
[facility]  OPERATIVE REPORT     Name: Jovana Monge  MR#: [id number]  : 1960  DATE OF SERVICE:  25     PREOPERATIVE DIAGNOSIS:  End-stage renal disease.     POSTOPERATIVE DIAGNOSIS:  End-stage renal disease.     PROCEDURE PERFORMED:  Laparoscopic peritoneal dialysis catheter insertion.     SURGEON:  Garry Pinedo MD     ASSISTANT:  Iam     ANESTHESIA:  General.     COMPLICATIONS:  None.     SPECIMENS REMOVED:  None.     IMPLANTS:  Peritoneal dialysis catheter.     ESTIMATED BLOOD LOSS:  Less than 50 mL       INDICATIONS FOR PROCEDURE:  The patient is a 64-year-old  female with end-stage renal disease that wishes to proceed with peritoneal dialysis.  Risks and benefits of peritoneal dialysis catheter placement are discussed with the patient and he wishes to proceed.     DESCRIPTION OF PROCEDURE IN DETAIL:  The patient was brought into the operating room, prepped and draped in the usual sterile fashion.   I made a horizontal incision, to the left of the umbilicus just above it and this was deepened down through the skin through Bovie electrocautery.  The anterior rectus sheath was identified and grasped with Kochers.  This was then incised longitudinally with Metzenbaum scissors.  Next, the rectus muscle was split and the posterior sheath was grabbed and this was incised with Metzenbaum, and a 5-mm trocar was placed under direct visualization into the abdominal cavity. Prior to this an 0 prolene pursestring suture was placed There was good insufflation.  The abdomen was inspected.  There was no gross abnormality.  Next, two additional 5 mm trocars were placed on the right upper quadrant and right lower quadrant under direct visualization.  The paramedian trocar was then withdrawn, the trocar was then skived down into the peritoneal cavity in the pelvis.  The obturator was removed and the peritoneal dialysis catheter was fed through this.  A grasper was

## 2025-01-24 NOTE — PERIOP NOTE
1150 - PT DENIES FEVER, COLD, COUGH, SOB, N/V, DIARRHEA.....  PRE-OP TCHING DONE - PT VERBALIZES UNDERSTANDING.  STRETCHER IN LOWEST POSITION, CB IN PLACE AND S RUP X2  1245 - epoc chem 8 done - ? Hemolyzed  specimen k+ 8.4.    1308 - repeat poc chem 8 done and specimen sent to lab.  Repeat poc k+ 4.9.

## 2025-01-24 NOTE — FLOWSHEET NOTE
01/24/25 1521   Handoff   Communication Given Periop Handoff/Relief   Handoff phase Phase I receiving   Handoff Given To Antwon Donahue RN   Handoff Received From Sole Millan RN/wilber Mares CRNA   Handoff Communication Face to Face;At bedside   Time Handoff Given 1521

## 2025-01-24 NOTE — ANESTHESIA POSTPROCEDURE EVALUATION
Department of Anesthesiology  Postprocedure Note    Patient: Jovana Monge  MRN: [id number]  YOB: 1960  Date of evaluation: 1/24/2025    Procedure Summary       Date: 01/24/25 Room / Location: [facility] MAIN OR M3 / [facility] MAIN OR    Anesthesia Start: 1347 Anesthesia Stop: 1524    Procedure: LAPAROSCOPIC PERITONEAL DIALYSIS CATHETER INSERTION (Abdomen) Diagnosis:       End stage renal disease (HCC)      (End stage renal disease (HCC) [N18.6])    Providers: Garry Pinedo MD Responsible Provider: Gabbie Perez MD    Anesthesia Type: General ASA Status: 3            Anesthesia Type: General    Julius Phase I: Julius Score: 8    Julius Phase II:      Anesthesia Post Evaluation    Patient location during evaluation: bedside  Patient participation: complete - patient participated  Level of consciousness: awake and alert  Pain scale: Controlled per protocol.  Airway patency: patent  Nausea & Vomiting: no nausea and no vomiting  Cardiovascular status: hemodynamically stable  Respiratory status: acceptable  Hydration status: stable  Multimodal analgesia pain management approach  Pain management: adequate    No notable events documented.

## 2025-01-24 NOTE — DISCHARGE INSTRUCTIONS
Patient Discharge Instructions    Jovana PRICE Phylliskristinaeva / [id number] : 1960    Admitted 2025 Discharged: 2025     Take Home Medications     {Medication reconciliation information is now added to the patient's AVS automatically when it is printed.  There is no need to use this SmartLink in discharge instructions.  Highlight this text and delete it to clear this message}       It is important that you take the medication exactly as they are prescribed.   Keep your medication in the bottles provided by the pharmacist and keep a list of the medication names, dosages, and times to be taken in your wallet.   Do not take other medications without consulting your doctor.       What to do at Home    Please contact your PD nurse for additional instructions and wound care.    Recommended diet: AHA    Recommended activity: As Tolerated. No Strenuous activity or heavy lifting    Follow-up with Dr Pinedo in 2 weeks [id number]        Information obtained by :  I understand that if any problems occur once I am at home I am to contact my physician.    I understand and acknowledge receipt of the instructions indicated above.                                                                                                                                           Physician's or R.N.'s Signature                                                                  Date/Time                                                                                                                                              Patient or Representative Signature                                                          Date/Time

## 2025-01-24 NOTE — FLOWSHEET NOTE
01/24/25 1643   AVS Reviewed   AVS & discharge instructions reviewed with patient and/or representative? Yes   Reviewed instructions with Patient;Other (name and relationship in comment)  ([place]-daughter)   Level of Understanding Questions answered;Verbalized understanding

## 2025-06-12 ENCOUNTER — TRANSCRIBE ORDERS (OUTPATIENT)
Facility: HOSPITAL | Age: 65
End: 2025-06-12

## 2025-06-12 DIAGNOSIS — N18.9 CHRONIC KIDNEY DISEASE, UNSPECIFIED CKD STAGE: ICD-10-CM

## 2025-06-12 DIAGNOSIS — Z76.82 AWAITING ORGAN TRANSPLANT STATUS: Primary | ICD-10-CM

## 2025-06-17 ENCOUNTER — TRANSCRIBE ORDERS (OUTPATIENT)
Facility: HOSPITAL | Age: 65
End: 2025-06-17

## 2025-06-17 DIAGNOSIS — Z76.82 AWAITING ORGAN TRANSPLANT STATUS: Primary | ICD-10-CM

## 2025-06-17 DIAGNOSIS — N18.9 CHRONIC KIDNEY DISEASE, UNSPECIFIED CKD STAGE: ICD-10-CM

## 2025-06-17 DIAGNOSIS — Z12.31 OTHER SCREENING MAMMOGRAM: Primary | ICD-10-CM

## 2025-06-20 ENCOUNTER — HOSPITAL ENCOUNTER (OUTPATIENT)
Facility: HOSPITAL | Age: 65
Discharge: HOME | End: 2025-06-22

## 2025-06-20 DIAGNOSIS — N18.9 CHRONIC KIDNEY DISEASE, UNSPECIFIED CKD STAGE: ICD-10-CM

## 2025-06-20 DIAGNOSIS — Z76.82 AWAITING ORGAN TRANSPLANT STATUS: ICD-10-CM

## 2025-07-03 ENCOUNTER — OFFICE VISIT (OUTPATIENT)
Age: 65
End: 2025-07-03

## 2025-07-03 VITALS
SYSTOLIC BLOOD PRESSURE: 116 MMHG | BODY MASS INDEX: 41.81 KG/M2 | WEIGHT: 236 LBS | DIASTOLIC BLOOD PRESSURE: 59 MMHG | HEART RATE: 94 BPM

## 2025-07-03 DIAGNOSIS — Z12.4 SCREENING FOR MALIGNANT NEOPLASM OF CERVIX: ICD-10-CM

## 2025-07-03 DIAGNOSIS — Z01.419 WELL WOMAN EXAM WITH ROUTINE GYNECOLOGICAL EXAM: Primary | ICD-10-CM

## 2025-07-03 NOTE — PROGRESS NOTES
anal fistula surgery       Current Outpatient Medications   Medication Sig Dispense Refill    dilTIAZem (DILACOR XR) 240 MG extended release capsule Take 1 capsule by mouth daily      furosemide (LASIX) 40 MG tablet Take 1 tablet by mouth in the morning and 1 tablet in the evening.      calcitRIOL (ROCALTROL) 0.25 MCG capsule Take 1 capsule by mouth daily      carvedilol (COREG) 6.25 MG tablet Take 1 tablet by mouth 2 times daily (with meals)      sevelamer (RENVELA) 800 MG tablet Take 1 tablet by mouth 3 times daily (with meals)      sodium bicarbonate 650 MG tablet Take 2 tablets by mouth 3 times daily      atorvastatin (LIPITOR) 40 MG tablet Take 1 tablet by mouth daily      insulin aspart (NOVOLOG) 100 UNIT/ML injection vial Inject into the skin 3 times daily (before meals) Sliding scale      insulin glargine (BASAGLAR KWIKPEN) 100 UNIT/ML injection pen Inject 20 Units into the skin daily      olmesartan (BENICAR) 20 MG tablet Take 1 tablet by mouth daily      Semaglutide, 1 MG/DOSE, (OZEMPIC, 1 MG/DOSE,) 2 MG/1.5ML SOPN INJECT 1 MG SUBCUTANEOUSLY EVERY SEVEN (7) DAYS. (Patient not taking: Reported on 1/20/2025)       No current facility-administered medications for this visit.     Allergies: Lisinopril     Tobacco History:  reports that she has quit smoking. Her smoking use included cigarettes. She has never used smokeless tobacco.  Alcohol Abuse:  reports no history of alcohol use.  Drug Abuse:  reports no history of drug use.    Family Medical/Cancer History:   Family History   Problem Relation Age of Onset    Hypertension Mother     Heart Failure Mother     Heart Disease Father     Diabetes Mother     Hypertension Father     Diabetes Father         Review of Systems - History obtained from the patient  Constitutional: negative for weight loss, fever, night sweats  HEENT: negative for hearing loss, earache, congestion, snoring, sorethroat  CV: negative for chest pain, palpitations, edema  Resp: negative

## 2025-07-07 NOTE — PRE-PROCEDURE INSTRUCTIONS
Patient called back. PAT completed. Patient verbalized understanding of arrival time of 0800, npo status, prep instructions, and the need for a ride home.

## 2025-07-07 NOTE — PRE-PROCEDURE INSTRUCTIONS
Attempted to complete pat for procedure on 7/8/25. No answer and unable to leave voicemail. Arrival time set to 0800 for [employer] message.

## 2025-07-08 ENCOUNTER — ANESTHESIA (OUTPATIENT)
Facility: HOSPITAL | Age: 65
End: 2025-07-08
Payer: COMMERCIAL

## 2025-07-08 ENCOUNTER — ANESTHESIA EVENT (OUTPATIENT)
Facility: HOSPITAL | Age: 65
End: 2025-07-08
Payer: COMMERCIAL

## 2025-07-08 ENCOUNTER — HOSPITAL ENCOUNTER (OUTPATIENT)
Facility: HOSPITAL | Age: 65
Setting detail: OUTPATIENT SURGERY
Discharge: HOME | End: 2025-07-08
Attending: INTERNAL MEDICINE | Admitting: INTERNAL MEDICINE
Payer: COMMERCIAL

## 2025-07-08 VITALS
RESPIRATION RATE: 16 BRPM | OXYGEN SATURATION: 99 % | HEART RATE: 82 BPM | DIASTOLIC BLOOD PRESSURE: 71 MMHG | TEMPERATURE: 98 F | SYSTOLIC BLOOD PRESSURE: 145 MMHG | BODY MASS INDEX: 41.29 KG/M2 | WEIGHT: 233 LBS | HEIGHT: 63 IN

## 2025-07-08 DIAGNOSIS — Z12.11 COLON CANCER SCREENING: ICD-10-CM

## 2025-07-08 LAB
GLUCOSE BLD STRIP.AUTO-MCNC: 135 MG/DL (ref 65–100)
PERFORMED BY:: ABNORMAL

## 2025-07-08 PROCEDURE — 2709999900 HC NON-CHARGEABLE SUPPLY: Performed by: INTERNAL MEDICINE

## 2025-07-08 PROCEDURE — 3600007502: Performed by: INTERNAL MEDICINE

## 2025-07-08 PROCEDURE — 2580000003 HC RX 258: Performed by: NURSE ANESTHETIST, CERTIFIED REGISTERED

## 2025-07-08 PROCEDURE — 82962 GLUCOSE BLOOD TEST: CPT

## 2025-07-08 PROCEDURE — 7100000011 HC PHASE II RECOVERY - ADDTL 15 MIN: Performed by: INTERNAL MEDICINE

## 2025-07-08 PROCEDURE — 88305 TISSUE EXAM BY PATHOLOGIST: CPT

## 2025-07-08 PROCEDURE — 3600007512: Performed by: INTERNAL MEDICINE

## 2025-07-08 PROCEDURE — 3700000001 HC ADD 15 MINUTES (ANESTHESIA): Performed by: INTERNAL MEDICINE

## 2025-07-08 PROCEDURE — 6360000002 HC RX W HCPCS: Performed by: NURSE ANESTHETIST, CERTIFIED REGISTERED

## 2025-07-08 PROCEDURE — 7100000010 HC PHASE II RECOVERY - FIRST 15 MIN: Performed by: INTERNAL MEDICINE

## 2025-07-08 PROCEDURE — 3700000000 HC ANESTHESIA ATTENDED CARE: Performed by: INTERNAL MEDICINE

## 2025-07-08 RX ORDER — LIDOCAINE HYDROCHLORIDE 20 MG/ML
INJECTION, SOLUTION EPIDURAL; INFILTRATION; INTRACAUDAL; PERINEURAL
Status: DISCONTINUED | OUTPATIENT
Start: 2025-07-08 | End: 2025-07-08 | Stop reason: SDUPTHER

## 2025-07-08 RX ORDER — 0.9 % SODIUM CHLORIDE 0.9 %
INTRAVENOUS SOLUTION INTRAVENOUS
Status: DISCONTINUED | OUTPATIENT
Start: 2025-07-08 | End: 2025-07-08 | Stop reason: SDUPTHER

## 2025-07-08 RX ORDER — AMOXICILLIN 500 MG/1
500 CAPSULE ORAL ONCE
COMMUNITY
Start: 2025-07-03

## 2025-07-08 RX ORDER — PROPOFOL 10 MG/ML
INJECTION, EMULSION INTRAVENOUS
Status: DISCONTINUED | OUTPATIENT
Start: 2025-07-08 | End: 2025-07-08 | Stop reason: SDUPTHER

## 2025-07-08 RX ORDER — SODIUM CHLORIDE, SODIUM LACTATE, POTASSIUM CHLORIDE, CALCIUM CHLORIDE 600; 310; 30; 20 MG/100ML; MG/100ML; MG/100ML; MG/100ML
INJECTION, SOLUTION INTRAVENOUS CONTINUOUS
Status: DISCONTINUED | OUTPATIENT
Start: 2025-07-08 | End: 2025-07-08 | Stop reason: HOSPADM

## 2025-07-08 RX ADMIN — PROPOFOL 30 MG: 10 INJECTION, EMULSION INTRAVENOUS at 09:37

## 2025-07-08 RX ADMIN — LIDOCAINE HYDROCHLORIDE 100 MG: 20 SOLUTION INTRAVENOUS at 09:24

## 2025-07-08 RX ADMIN — PROPOFOL 50 MG: 10 INJECTION, EMULSION INTRAVENOUS at 09:33

## 2025-07-08 RX ADMIN — PROPOFOL 40 MG: 10 INJECTION, EMULSION INTRAVENOUS at 09:28

## 2025-07-08 RX ADMIN — PROPOFOL 50 MG: 10 INJECTION, EMULSION INTRAVENOUS at 09:34

## 2025-07-08 RX ADMIN — PROPOFOL 80 MG: 10 INJECTION, EMULSION INTRAVENOUS at 09:24

## 2025-07-08 RX ADMIN — SODIUM CHLORIDE 20 ML/HR: 900 INJECTION, SOLUTION INTRAVENOUS at 09:17

## 2025-07-08 RX ADMIN — PROPOFOL 30 MG: 10 INJECTION, EMULSION INTRAVENOUS at 09:30

## 2025-07-08 ASSESSMENT — PAIN - FUNCTIONAL ASSESSMENT
PAIN_FUNCTIONAL_ASSESSMENT: NONE - DENIES PAIN
PAIN_FUNCTIONAL_ASSESSMENT: 0-10
PAIN_FUNCTIONAL_ASSESSMENT: NONE - DENIES PAIN

## 2025-07-08 ASSESSMENT — ENCOUNTER SYMPTOMS: SHORTNESS OF BREATH: 1

## 2025-07-08 NOTE — ANESTHESIA PRE PROCEDURE
Department of Anesthesiology  Preprocedure Note       Name:  Jovana Monge   Age:  64 y.o.  :  1960                                          MRN:  [id number]         Date:  2025      Surgeon: Surgeon(s):  Eleazar Mathew MD    Procedure: Procedure(s):  COLONOSCOPY WITH POSSIBLE POLYPECTOMY/BIOPSY    Medications prior to admission:   Prior to Admission medications    Medication Sig Start Date End Date Taking? Authorizing Provider   amoxicillin (AMOXIL) 500 MG capsule Take 1 capsule by mouth 1 time 7/3/25  Yes Russ Kimbrough MD   dilTIAZem (DILACOR XR) 240 MG extended release capsule Take 1 capsule by mouth daily   Yes ProviderRuss MD   carvedilol (COREG) 6.25 MG tablet Take 1 tablet by mouth 2 times daily (with meals)   Yes Russ Kimbrough MD   olmesartan (BENICAR) 20 MG tablet Take 1 tablet by mouth daily 3/26/20  Yes Automatic Reconciliation, Ar   furosemide (LASIX) 40 MG tablet Take 1 tablet by mouth in the morning and 1 tablet in the evening.    ProviderRuss MD   calcitRIOL (ROCALTROL) 0.25 MCG capsule Take 1 capsule by mouth daily    ProviderRuss MD   sevelamer (RENVELA) 800 MG tablet Take 1 tablet by mouth 3 times daily (with meals)    Russ Kimbrough MD   sodium bicarbonate 650 MG tablet Take 2 tablets by mouth 3 times daily    Russ Kimbrough MD   atorvastatin (LIPITOR) 40 MG tablet Take 1 tablet by mouth daily 16   Automatic Reconciliation, Ar   insulin aspart (NOVOLOG) 100 UNIT/ML injection vial Inject into the skin 3 times daily (before meals) Sliding scale 3/10/21   Automatic Reconciliation, Ar   insulin glargine (BASAGLAR KWIKPEN) 100 UNIT/ML injection pen Inject 20 Units into the skin daily 21   Automatic Reconciliation, Ar   Semaglutide, 1 MG/DOSE, (OZEMPIC, 1 MG/DOSE,) 2 MG/1.5ML SOPN INJECT 1 MG SUBCUTANEOUSLY EVERY SEVEN (7) DAYS.  Patient not taking: Reported on 2025   Automatic Reconciliation, Ar

## 2025-07-08 NOTE — ANESTHESIA POSTPROCEDURE EVALUATION
Department of Anesthesiology  Postprocedure Note    Patient: Jovana Monge  MRN: [id number]  YOB: 1960  Date of evaluation: 7/8/2025    Procedure Summary       Date: 07/08/25 Room / Location: [facility] 01 / [facility] ENDOSCOPY    Anesthesia Start: 0917 Anesthesia Stop: 0945    Procedure: COLONOSCOPY WITH POSSIBLE POLYPECTOMY/BIOPSY (Lower GI Region) Diagnosis:       Colon cancer screening      (Colon cancer screening [Z12.11])    Surgeons: Eleazar Mathew MD Responsible Provider: Briana Guerrero MD    Anesthesia Type: MAC, TIVA ASA Status: 3            Anesthesia Type: MAC, TIVA    Julius Phase I: Julius Score: 10    Julius Phase II:      Anesthesia Post Evaluation    Patient location during evaluation: bedside  Patient participation: complete - patient participated  Level of consciousness: sleepy but conscious  Pain score: 0  Airway patency: patent  Nausea & Vomiting: no nausea and no vomiting  Cardiovascular status: hemodynamically stable  Respiratory status: spontaneous ventilation and acceptable  Hydration status: stable  Comments: Patient handed off to Endoscopy RN w/ adequate spontaneous respirations, moving all 4 extremities, and all questions answered about anesthesia care. VSS.  Pain management: adequate    No notable events documented.

## 2025-07-09 LAB
., LABCORP: ABNORMAL
C TRACH RRNA CVX QL NAA+PROBE: NEGATIVE
CYTOLOGIST CVX/VAG CYTO: ABNORMAL
CYTOLOGY CVX/VAG DOC CYTO: ABNORMAL
CYTOLOGY CVX/VAG DOC THIN PREP: ABNORMAL
DX ICD CODE: ABNORMAL
N GONORRHOEA RRNA CVX QL NAA+PROBE: NEGATIVE
OTHER STN SPEC: ABNORMAL
SERVICE CMNT-IMP: ABNORMAL
STAT OF ADQ CVX/VAG CYTO-IMP: ABNORMAL
T VAGINALIS RRNA SPEC QL NAA+PROBE: POSITIVE

## 2025-07-11 ENCOUNTER — HOSPITAL ENCOUNTER (OUTPATIENT)
Facility: HOSPITAL | Age: 65
Discharge: HOME | End: 2025-07-14
Payer: MEDICARE

## 2025-07-11 DIAGNOSIS — Z76.82 AWAITING ORGAN TRANSPLANT STATUS: ICD-10-CM

## 2025-07-11 DIAGNOSIS — Z12.31 OTHER SCREENING MAMMOGRAM: ICD-10-CM

## 2025-07-11 DIAGNOSIS — N18.9 CHRONIC KIDNEY DISEASE, UNSPECIFIED CKD STAGE: ICD-10-CM

## 2025-07-11 PROCEDURE — 74176 CT ABD & PELVIS W/O CONTRAST: CPT

## 2025-07-11 PROCEDURE — 77067 SCR MAMMO BI INCL CAD: CPT

## 2025-07-11 PROCEDURE — 77063 BREAST TOMOSYNTHESIS BI: CPT

## 2025-07-28 ENCOUNTER — HOSPITAL ENCOUNTER (OUTPATIENT)
Facility: HOSPITAL | Age: 65
Discharge: HOME | End: 2025-07-31
Payer: COMMERCIAL

## 2025-07-28 ENCOUNTER — TRANSCRIBE ORDERS (OUTPATIENT)
Facility: HOSPITAL | Age: 65
End: 2025-07-28

## 2025-07-28 DIAGNOSIS — Z76.82 AWAITING ORGAN TRANSPLANT STATUS: ICD-10-CM

## 2025-07-28 DIAGNOSIS — Z01.818 PRE-TRANSPLANT EVALUATION FOR END STAGE RENAL DISEASE: ICD-10-CM

## 2025-07-28 DIAGNOSIS — Z76.82 AWAITING ORGAN TRANSPLANT: ICD-10-CM

## 2025-07-28 DIAGNOSIS — Z76.82 KIDNEY TRANSPLANT CANDIDATE: ICD-10-CM

## 2025-07-28 DIAGNOSIS — N18.6 END STAGE RENAL DISEASE (HCC): ICD-10-CM

## 2025-07-28 DIAGNOSIS — Z01.818 PRE-TRANSPLANT EVALUATION FOR END STAGE RENAL DISEASE: Primary | ICD-10-CM

## 2025-07-28 LAB
ALBUMIN SERPL-MCNC: 2.8 G/DL (ref 3.5–5)
ALBUMIN/GLOB SERPL: 0.7 (ref 1.1–2.2)
ALP SERPL-CCNC: 124 U/L (ref 45–117)
ALT SERPL-CCNC: 13 U/L (ref 12–78)
ANION GAP SERPL CALC-SCNC: 8 MMOL/L (ref 2–12)
AST SERPL W P-5'-P-CCNC: 10 U/L (ref 15–37)
BASOPHILS # BLD: 0.05 K/UL (ref 0–0.1)
BASOPHILS NFR BLD: 0.5 % (ref 0–1)
BILIRUB SERPL-MCNC: 0.2 MG/DL (ref 0.2–1)
BUN SERPL-MCNC: 71 MG/DL (ref 6–20)
BUN/CREAT SERPL: 9 (ref 12–20)
CA-I BLD-MCNC: 9.4 MG/DL (ref 8.5–10.1)
CHLORIDE SERPL-SCNC: 106 MMOL/L (ref 97–108)
CO2 SERPL-SCNC: 25 MMOL/L (ref 21–32)
CREAT SERPL-MCNC: 7.73 MG/DL (ref 0.55–1.02)
DIFFERENTIAL METHOD BLD: ABNORMAL
EOSINOPHIL # BLD: 0.32 K/UL (ref 0–0.4)
EOSINOPHIL NFR BLD: 3.4 % (ref 0–7)
ERYTHROCYTE [DISTWIDTH] IN BLOOD BY AUTOMATED COUNT: 14.8 % (ref 11.5–14.5)
GLOBULIN SER CALC-MCNC: 4.3 G/DL (ref 2–4)
GLUCOSE SERPL-MCNC: 153 MG/DL (ref 65–100)
HCT VFR BLD AUTO: 33.7 % (ref 35–47)
HGB BLD-MCNC: 10.5 G/DL (ref 11.5–16)
IMM GRANULOCYTES # BLD AUTO: 0.07 K/UL (ref 0–0.04)
IMM GRANULOCYTES NFR BLD AUTO: 0.8 % (ref 0–0.5)
LYMPHOCYTES # BLD: 1.97 K/UL (ref 0.8–3.5)
LYMPHOCYTES NFR BLD: 21.2 % (ref 12–49)
MCH RBC QN AUTO: 29.7 PG (ref 26–34)
MCHC RBC AUTO-ENTMCNC: 31.2 G/DL (ref 30–36.5)
MCV RBC AUTO: 95.5 FL (ref 80–99)
MONOCYTES # BLD: 0.84 K/UL (ref 0–1)
MONOCYTES NFR BLD: 9 % (ref 5–13)
NEUTS SEG # BLD: 6.04 K/UL (ref 1.8–8)
NEUTS SEG NFR BLD: 65.1 % (ref 32–75)
NRBC # BLD: 0 K/UL (ref 0–0.01)
NRBC BLD-RTO: 0 PER 100 WBC
PHOSPHATE SERPL-MCNC: 6.7 MG/DL (ref 2.6–4.7)
PLATELET # BLD AUTO: 240 K/UL (ref 150–400)
PMV BLD AUTO: 11 FL (ref 8.9–12.9)
POTASSIUM SERPL-SCNC: 5.8 MMOL/L (ref 3.5–5.1)
PROT SERPL-MCNC: 7.1 G/DL (ref 6.4–8.2)
RBC # BLD AUTO: 3.53 M/UL (ref 3.8–5.2)
SODIUM SERPL-SCNC: 139 MMOL/L (ref 136–145)
WBC # BLD AUTO: 9.3 K/UL (ref 3.6–11)

## 2025-07-28 PROCEDURE — 86735 MUMPS ANTIBODY: CPT

## 2025-07-28 PROCEDURE — 86708 HEPATITIS A ANTIBODY: CPT

## 2025-07-28 PROCEDURE — 86225 DNA ANTIBODY NATIVE: CPT

## 2025-07-28 PROCEDURE — 87389 HIV-1 AG W/HIV-1&-2 AB AG IA: CPT

## 2025-07-28 PROCEDURE — 86480 TB TEST CELL IMMUN MEASURE: CPT

## 2025-07-28 PROCEDURE — 86644 CMV ANTIBODY: CPT

## 2025-07-28 PROCEDURE — 86787 VARICELLA-ZOSTER ANTIBODY: CPT

## 2025-07-28 PROCEDURE — 86803 HEPATITIS C AB TEST: CPT

## 2025-07-28 PROCEDURE — 83970 ASSAY OF PARATHORMONE: CPT

## 2025-07-28 PROCEDURE — 80053 COMPREHEN METABOLIC PANEL: CPT

## 2025-07-28 PROCEDURE — 85730 THROMBOPLASTIN TIME PARTIAL: CPT

## 2025-07-28 PROCEDURE — 86704 HEP B CORE ANTIBODY TOTAL: CPT

## 2025-07-28 PROCEDURE — 86696 HERPES SIMPLEX TYPE 2 TEST: CPT

## 2025-07-28 PROCEDURE — 84100 ASSAY OF PHOSPHORUS: CPT

## 2025-07-28 PROCEDURE — 80307 DRUG TEST PRSMV CHEM ANLYZR: CPT

## 2025-07-28 PROCEDURE — 85025 COMPLETE CBC W/AUTO DIFF WBC: CPT

## 2025-07-28 PROCEDURE — 86706 HEP B SURFACE ANTIBODY: CPT

## 2025-07-28 PROCEDURE — 86780 TREPONEMA PALLIDUM: CPT

## 2025-07-28 PROCEDURE — 87529 HSV DNA AMP PROBE: CPT

## 2025-07-28 PROCEDURE — 85610 PROTHROMBIN TIME: CPT

## 2025-07-28 PROCEDURE — 86765 RUBEOLA ANTIBODY: CPT

## 2025-07-28 PROCEDURE — 86762 RUBELLA ANTIBODY: CPT

## 2025-07-28 PROCEDURE — 36415 COLL VENOUS BLD VENIPUNCTURE: CPT

## 2025-07-28 PROCEDURE — 86376 MICROSOMAL ANTIBODY EACH: CPT

## 2025-07-29 LAB
CA-I BLD-MCNC: 9.4 MG/DL (ref 8.8–10.2)
HBV SURFACE AB SERPL IA-ACNC: REACTIVE MIU/ML
HIV 1+2 AB+HIV1 P24 AG SERPL QL IA: NONREACTIVE
HIV 1/2 RESULT COMMENT: NORMAL
INR PPP: 1 (ref 0.9–1.1)
PROTHROMBIN TIME: 10.8 SEC (ref 9.2–11.2)
PT IMM NP PPP: NORMAL SEC
PT MIXING STUDY: NORMAL
PTH-INTACT SERPL-MCNC: 431 PG/ML (ref 15–65)

## 2025-07-30 LAB
CMV IGG SERPL IA-ACNC: <0.6 U/ML (ref 0–0.59)
HSV2 IGG SER IA-ACNC: REACTIVE

## 2025-07-31 ENCOUNTER — HOSPITAL ENCOUNTER (EMERGENCY)
Facility: HOSPITAL | Age: 65
Discharge: HOME | End: 2025-07-31
Attending: STUDENT IN AN ORGANIZED HEALTH CARE EDUCATION/TRAINING PROGRAM
Payer: MEDICARE

## 2025-07-31 VITALS
SYSTOLIC BLOOD PRESSURE: 118 MMHG | HEART RATE: 76 BPM | RESPIRATION RATE: 20 BRPM | OXYGEN SATURATION: 94 % | TEMPERATURE: 98.6 F | DIASTOLIC BLOOD PRESSURE: 72 MMHG

## 2025-07-31 DIAGNOSIS — M25.50 ARTHRALGIA, UNSPECIFIED JOINT: Primary | ICD-10-CM

## 2025-07-31 LAB
ABO + RH BLD: NORMAL
ANION GAP SERPL CALC-SCNC: 11 MMOL/L (ref 2–12)
BASOPHILS # BLD: 0.04 K/UL (ref 0–0.1)
BASOPHILS NFR BLD: 0.5 % (ref 0–1)
BLOOD GROUP ANTIBODIES SERPL: NEGATIVE
BUN SERPL-MCNC: 81 MG/DL (ref 6–20)
BUN/CREAT SERPL: 9 (ref 12–20)
CA-I BLD-MCNC: 8.8 MG/DL (ref 8.5–10.1)
CHLORIDE SERPL-SCNC: 106 MMOL/L (ref 97–108)
CO2 SERPL-SCNC: 23 MMOL/L (ref 21–32)
CREAT SERPL-MCNC: 8.74 MG/DL (ref 0.55–1.02)
DIFFERENTIAL METHOD BLD: ABNORMAL
EOSINOPHIL # BLD: 0.27 K/UL (ref 0–0.4)
EOSINOPHIL NFR BLD: 3.5 % (ref 0–7)
ERYTHROCYTE [DISTWIDTH] IN BLOOD BY AUTOMATED COUNT: 14.6 % (ref 11.5–14.5)
GLUCOSE BLD STRIP.AUTO-MCNC: 92 MG/DL (ref 65–100)
GLUCOSE SERPL-MCNC: 116 MG/DL (ref 65–100)
HAV AB SER QL IA: NEGATIVE
HBV CORE AB SERPL QL IA: NEGATIVE
HCT VFR BLD AUTO: 29.8 % (ref 35–47)
HGB BLD-MCNC: 9.4 G/DL (ref 11.5–16)
IMM GRANULOCYTES # BLD AUTO: 0.04 K/UL (ref 0–0.04)
IMM GRANULOCYTES NFR BLD AUTO: 0.5 % (ref 0–0.5)
INR PPP: 1.1 (ref 0.9–1.1)
LYMPHOCYTES # BLD: 1.87 K/UL (ref 0.8–3.5)
LYMPHOCYTES NFR BLD: 24.5 % (ref 12–49)
MCH RBC QN AUTO: 29.2 PG (ref 26–34)
MCHC RBC AUTO-ENTMCNC: 31.5 G/DL (ref 30–36.5)
MCV RBC AUTO: 92.5 FL (ref 80–99)
MEV IGG SER IA-ACNC: >300 AU/ML
MONOCYTES # BLD: 0.73 K/UL (ref 0–1)
MONOCYTES NFR BLD: 9.6 % (ref 5–13)
MUV IGG SER IA-ACNC: 94.1 AU/ML
NEUTS SEG # BLD: 4.67 K/UL (ref 1.8–8)
NEUTS SEG NFR BLD: 61.4 % (ref 32–75)
NRBC # BLD: 0 K/UL (ref 0–0.01)
NRBC BLD-RTO: 0 PER 100 WBC
PERFORMED BY:: NORMAL
PLATELET # BLD AUTO: 206 K/UL (ref 150–400)
PMV BLD AUTO: 11 FL (ref 8.9–12.9)
POTASSIUM SERPL-SCNC: 5.8 MMOL/L (ref 3.5–5.1)
PROTHROMBIN TIME: 13.9 SEC (ref 11.9–14.1)
RBC # BLD AUTO: 3.22 M/UL (ref 3.8–5.2)
SODIUM SERPL-SCNC: 140 MMOL/L (ref 136–145)
SPECIMEN EXP DATE BLD: NORMAL
T PALLIDUM AB SER QL IA: NON REACTIVE
VZV IGG SER IA-ACNC: REACTIVE
VZV IGM SER IA-ACNC: <0.91 INDEX (ref 0–0.9)
WBC # BLD AUTO: 7.6 K/UL (ref 3.6–11)

## 2025-07-31 PROCEDURE — 36415 COLL VENOUS BLD VENIPUNCTURE: CPT

## 2025-07-31 PROCEDURE — 86900 BLOOD TYPING SEROLOGIC ABO: CPT

## 2025-07-31 PROCEDURE — 82962 GLUCOSE BLOOD TEST: CPT

## 2025-07-31 PROCEDURE — 85025 COMPLETE CBC W/AUTO DIFF WBC: CPT

## 2025-07-31 PROCEDURE — 87389 HIV-1 AG W/HIV-1&-2 AB AG IA: CPT

## 2025-07-31 PROCEDURE — 80048 BASIC METABOLIC PNL TOTAL CA: CPT

## 2025-07-31 PROCEDURE — 86777 TOXOPLASMA ANTIBODY: CPT

## 2025-07-31 PROCEDURE — 85610 PROTHROMBIN TIME: CPT

## 2025-07-31 PROCEDURE — 6360000002 HC RX W HCPCS: Performed by: STUDENT IN AN ORGANIZED HEALTH CARE EDUCATION/TRAINING PROGRAM

## 2025-07-31 PROCEDURE — 86850 RBC ANTIBODY SCREEN: CPT

## 2025-07-31 PROCEDURE — 86901 BLOOD TYPING SEROLOGIC RH(D): CPT

## 2025-07-31 PROCEDURE — 86664 EPSTEIN-BARR NUCLEAR ANTIGEN: CPT

## 2025-07-31 PROCEDURE — 6370000000 HC RX 637 (ALT 250 FOR IP): Performed by: STUDENT IN AN ORGANIZED HEALTH CARE EDUCATION/TRAINING PROGRAM

## 2025-07-31 PROCEDURE — 96374 THER/PROPH/DIAG INJ IV PUSH: CPT

## 2025-07-31 PROCEDURE — 83036 HEMOGLOBIN GLYCOSYLATED A1C: CPT

## 2025-07-31 PROCEDURE — 2580000003 HC RX 258: Performed by: STUDENT IN AN ORGANIZED HEALTH CARE EDUCATION/TRAINING PROGRAM

## 2025-07-31 PROCEDURE — 99284 EMERGENCY DEPT VISIT MOD MDM: CPT

## 2025-07-31 RX ORDER — ALBUTEROL SULFATE 2.5 MG/.5ML
10 SOLUTION RESPIRATORY (INHALATION) ONCE
Status: COMPLETED | OUTPATIENT
Start: 2025-07-31 | End: 2025-07-31

## 2025-07-31 RX ORDER — ACETAMINOPHEN 500 MG
1000 TABLET ORAL EVERY 6 HOURS PRN
Qty: 40 TABLET | Refills: 0 | Status: SHIPPED | OUTPATIENT
Start: 2025-07-31

## 2025-07-31 RX ORDER — LIDOCAINE 50 MG/G
1 PATCH TOPICAL DAILY
Qty: 10 PATCH | Refills: 0 | Status: SHIPPED | OUTPATIENT
Start: 2025-07-31 | End: 2025-08-10

## 2025-07-31 RX ORDER — ACETAMINOPHEN 500 MG
1000 TABLET ORAL
Status: COMPLETED | OUTPATIENT
Start: 2025-07-31 | End: 2025-07-31

## 2025-07-31 RX ORDER — OXYCODONE HYDROCHLORIDE 5 MG/1
5 TABLET ORAL
Refills: 0 | Status: COMPLETED | OUTPATIENT
Start: 2025-07-31 | End: 2025-07-31

## 2025-07-31 RX ORDER — DEXTROSE MONOHYDRATE 100 MG/ML
INJECTION, SOLUTION INTRAVENOUS CONTINUOUS PRN
Status: DISCONTINUED | OUTPATIENT
Start: 2025-07-31 | End: 2025-08-01 | Stop reason: HOSPADM

## 2025-07-31 RX ORDER — GLUCAGON 1 MG/ML
1 KIT INJECTION PRN
Status: DISCONTINUED | OUTPATIENT
Start: 2025-07-31 | End: 2025-08-01 | Stop reason: HOSPADM

## 2025-07-31 RX ADMIN — DEXTROSE 250 ML: 10 SOLUTION INTRAVENOUS at 19:23

## 2025-07-31 RX ADMIN — INSULIN HUMAN 10 UNITS: 100 INJECTION, SOLUTION PARENTERAL at 19:27

## 2025-07-31 RX ADMIN — SODIUM ZIRCONIUM CYCLOSILICATE 10 G: 10 POWDER, FOR SUSPENSION ORAL at 19:31

## 2025-07-31 RX ADMIN — OXYCODONE 5 MG: 5 TABLET ORAL at 17:28

## 2025-07-31 RX ADMIN — ALBUTEROL SULFATE 10 MG: 2.5 SOLUTION RESPIRATORY (INHALATION) at 19:20

## 2025-07-31 RX ADMIN — ACETAMINOPHEN 1000 MG: 500 TABLET ORAL at 17:28

## 2025-07-31 ASSESSMENT — LIFESTYLE VARIABLES
HOW OFTEN DO YOU HAVE A DRINK CONTAINING ALCOHOL: NEVER
HOW MANY STANDARD DRINKS CONTAINING ALCOHOL DO YOU HAVE ON A TYPICAL DAY: PATIENT DOES NOT DRINK

## 2025-07-31 NOTE — ED TRIAGE NOTES
Patient bib son in wheelchair after multiple falls recently r/t her knees giving out. States she sees an orthopedic and was started on lyrica for sciatica but unable to bear full weight and stand on own.     Denies any injury/hitting her head during these falls, endorses they are all mechanical in nature with her joints giving out.    Pain mostly in bilateral knees and elbows

## 2025-08-01 LAB
EBV NA IGG SER-ACNC: 373 U/ML (ref 0–17.9)
EST. AVERAGE GLUCOSE BLD GHB EST-MCNC: 172 MG/DL
HBA1C MFR BLD: 7.6 % (ref 4–5.6)
HSV1 DNA SPEC QL NAA+PROBE: NEGATIVE
HSV2 DNA SPEC QL NAA+PROBE: NEGATIVE
SPECIMEN SOURCE: NORMAL
T GONDII IGG SERPL IA-ACNC: <3 IU/ML (ref 0–7.1)

## 2025-08-03 LAB
M TB IFN-G BLD-IMP: NEGATIVE
M TB IFN-G CD4+ T-CELLS BLD-ACNC: 0.02 IU/ML
M TBIFN-G CD4+ CD8+T-CELLS BLD-ACNC: 0.03 IU/ML
QUANTIFERON CRITERIA: NORMAL
QUANTIFERON MITOGEN VALUE: >10 IU/ML
QUANTIFERON NIL VALUE: 0.04 IU/ML

## 2025-08-04 NOTE — ED PROVIDER NOTES
[facility] EMERGENCY DEPT  EMERGENCY DEPARTMENT HISTORY AND PHYSICAL EXAM      Date of evaluation: 2025  Patient Name: Jovana Monge  Birthdate 1960  MRN: [id number]  ED Provider: Stephanie Dumont MD   Note Started: 7:52 AM EDT 25    HISTORY OF PRESENT ILLNESS     Chief Complaint   Patient presents with   • Fall   • Joint Pain       History Provided By: Patient, only     HPI: Jovana Monge is a 65 y.o. female with PMH of DM, HTN, HLD, and CAD who comes to the ED for evaluation of a fall resulted in knee pain.  Patient reports that she has chronic pain in her knees bilaterally as well as her elbows.  She had a mechanical fall.  Denies any head trauma.  Denies any trauma to any of her extremities.  Her pain is chronic in nature.  Worsened by ambulation without additional gripping relieving factors.  Patient is denying any chest pain shortness of breath.  No recent illnesses or any other symptoms concerns today.      PAST MEDICAL HISTORY   Past Medical History:  Past Medical History:   Diagnosis Date   • Arthritis    • CAD (coronary artery disease)     non obstructive per cardiology note from cardiac cath in    • Diabetes mellitus, type II (HCC)    • ESRD (end stage renal disease) ([facility])     stage 5 as of 10/2/24 nephrology note-pending PD cath insertion on 25   • Hyperlipidemia    • Hypertension    • Sleep apnea     does not use CPAP   • Tachycardia        Past Surgical History:  Past Surgical History:   Procedure Laterality Date   • CARDIAC CATHETERIZATION  2020   • CATARACT REMOVAL Bilateral 3/1/21, 2/15/21   •  SECTION      x 2 ,    • COLONOSCOPY N/A 2025    COLONOSCOPY WITH POSSIBLE POLYPECTOMY/BIOPSY performed by Eleazar Mathew MD at [facility] ENDOSCOPY   • DIALYSIS CATHETER INSERTION N/A 2025    LAPAROSCOPIC PERITONEAL DIALYSIS CATHETER INSERTION performed by Garry Pinedo MD at [facility] MAIN OR   • FOOT SURGERY Left     fb removal  glass   • RECTAL  SURGERY      anal fistula surgery       Family History:  Family History   Problem Relation Age of Onset   • Hypertension Mother    • Heart Failure Mother    • Diabetes Mother    • Heart Disease Father    • Hypertension Father    • Diabetes Father    • Lung Cancer Brother        Social History:  Social History     Tobacco Use   • Smoking status: Former     Current packs/day: 1.00     Types: Cigarettes   • Smokeless tobacco: Never   Vaping Use   • Vaping status: Never Used   Substance Use Topics   • Alcohol use: No   • Drug use: No       Allergies:  Allergies   Allergen Reactions   • Lisinopril Angioedema and Cough     \"lungs shut down\"  Pt confirms she do not have an Epi Pen prescribed to date - 12/11/18       PCP: Deanna Hobson APRN - NP    Current Meds:   No current facility-administered medications for this encounter.     Current Outpatient Medications   Medication Sig Dispense Refill   • acetaminophen (TYLENOL) 500 MG tablet Take 2 tablets by mouth every 6 hours as needed for Pain 40 tablet 0   • lidocaine (LIDODERM) 5 % Place 1 patch onto the skin daily for 10 days 12 hours on, 12 hours off. 10 patch 0   • amoxicillin (AMOXIL) 500 MG capsule Take 1 capsule by mouth 1 time     • dilTIAZem (DILACOR XR) 240 MG extended release capsule Take 1 capsule by mouth daily     • furosemide (LASIX) 40 MG tablet Take 1 tablet by mouth in the morning and 1 tablet in the evening.     • calcitRIOL (ROCALTROL) 0.25 MCG capsule Take 1 capsule by mouth daily     • carvedilol (COREG) 6.25 MG tablet Take 1 tablet by mouth 2 times daily (with meals)     • sevelamer (RENVELA) 800 MG tablet Take 1 tablet by mouth 3 times daily (with meals)     • sodium bicarbonate 650 MG tablet Take 2 tablets by mouth 3 times daily     • atorvastatin (LIPITOR) 40 MG tablet Take 1 tablet by mouth daily     • insulin aspart (NOVOLOG) 100 UNIT/ML injection vial Inject into the skin 3 times daily (before meals) Sliding scale     • insulin glargine

## 2025-08-14 ENCOUNTER — TRANSCRIBE ORDERS (OUTPATIENT)
Facility: HOSPITAL | Age: 65
End: 2025-08-14

## 2025-08-14 DIAGNOSIS — N18.9 CHRONIC KIDNEY DISEASE, UNSPECIFIED CKD STAGE: ICD-10-CM

## 2025-08-14 DIAGNOSIS — Z76.82 AWAITING ORGAN TRANSPLANT STATUS: Primary | ICD-10-CM

## 2025-08-25 ENCOUNTER — TRANSCRIBE ORDERS (OUTPATIENT)
Facility: HOSPITAL | Age: 65
End: 2025-08-25

## 2025-08-25 DIAGNOSIS — N18.9 CHRONIC KIDNEY DISEASE, UNSPECIFIED CKD STAGE: ICD-10-CM

## 2025-08-25 DIAGNOSIS — Z76.82 PRE-KIDNEY TRANSPLANT, PATIENT ON TRANSPLANT LIST: Primary | ICD-10-CM

## 2025-08-28 ENCOUNTER — HOSPITAL ENCOUNTER (OUTPATIENT)
Facility: HOSPITAL | Age: 65
Discharge: HOME | End: 2025-08-31

## 2025-08-28 DIAGNOSIS — N18.9 CHRONIC KIDNEY DISEASE, UNSPECIFIED CKD STAGE: ICD-10-CM

## 2025-08-28 DIAGNOSIS — Z76.82 PRE-KIDNEY TRANSPLANT, PATIENT ON TRANSPLANT LIST: ICD-10-CM

## 2025-08-31 LAB
HSV1 DNA SPEC QL NAA+PROBE: NEGATIVE
HSV2 DNA SPEC QL NAA+PROBE: NEGATIVE
Lab: NORMAL
REFERENCE LAB: NORMAL
SPECIMEN SOURCE: NORMAL

## 2025-09-03 ENCOUNTER — TRANSCRIBE ORDERS (OUTPATIENT)
Facility: HOSPITAL | Age: 65
End: 2025-09-03

## 2025-09-03 DIAGNOSIS — N18.9 CHRONIC KIDNEY DISEASE, UNSPECIFIED CKD STAGE: ICD-10-CM

## 2025-09-03 DIAGNOSIS — Z76.82 PRE-KIDNEY TRANSPLANT, PATIENT ON TRANSPLANT LIST: Primary | ICD-10-CM

## 2025-09-04 ENCOUNTER — HOSPITAL ENCOUNTER (OUTPATIENT)
Facility: HOSPITAL | Age: 65
End: 2025-09-04
Payer: MEDICARE

## 2025-09-04 ENCOUNTER — HOSPITAL ENCOUNTER (OUTPATIENT)
Facility: HOSPITAL | Age: 65
Discharge: HOME | End: 2025-09-04
Payer: MEDICARE

## 2025-09-04 ENCOUNTER — HOSPITAL ENCOUNTER (OUTPATIENT)
Facility: HOSPITAL | Age: 65
Discharge: HOME | End: 2025-09-06
Payer: MEDICARE

## 2025-09-04 VITALS
SYSTOLIC BLOOD PRESSURE: 211 MMHG | HEIGHT: 63 IN | BODY MASS INDEX: 41.29 KG/M2 | DIASTOLIC BLOOD PRESSURE: 104 MMHG | HEART RATE: 75 BPM | WEIGHT: 233 LBS

## 2025-09-04 DIAGNOSIS — N18.9 CHRONIC KIDNEY DISEASE, UNSPECIFIED CKD STAGE: ICD-10-CM

## 2025-09-04 DIAGNOSIS — Z76.82 PRE-KIDNEY TRANSPLANT, PATIENT ON TRANSPLANT LIST: ICD-10-CM

## 2025-09-04 LAB
ECHO BSA: 2.17 M2
NUC STRESS EJECTION FRACTION: 62 %
STRESS BASELINE DIAS BP: 102 MMHG
STRESS BASELINE HR: 75 BPM
STRESS BASELINE SYS BP: 201 MMHG
STRESS STAGE 1 DURATION: NORMAL MIN:SEC
STRESS STAGE 1 HR: 89 BPM
STRESS STAGE 2 BP: NORMAL MMHG
STRESS STAGE 2 DURATION: NORMAL MIN:SEC
STRESS STAGE 2 HR: 87 BPM
STRESS STAGE 3 DURATION: NORMAL MIN:SEC
STRESS STAGE 3 HR: 85 BPM
STRESS STAGE 4 BP: NORMAL MMHG
STRESS STAGE 4 DURATION: NORMAL MIN:SEC
STRESS STAGE 4 HR: 86 BPM
STRESS STAGE 5 COMMENTS: NORMAL
STRESS STAGE 5 DURATION: NORMAL MIN:SEC
STRESS STAGE 5 HR: 85 BPM
STRESS STAGE 6 DURATION: NORMAL MIN:SEC
STRESS STAGE 6 HR: 84 BPM
STRESS STAGE 7 COMMENTS: NORMAL
STRESS TARGET HR: 157 BPM

## 2025-09-04 PROCEDURE — 93017 CV STRESS TEST TRACING ONLY: CPT

## 2025-09-04 PROCEDURE — 3430000000 HC RX DIAGNOSTIC RADIOPHARMACEUTICAL: Performed by: INTERNAL MEDICINE

## 2025-09-04 PROCEDURE — A9500 TC99M SESTAMIBI: HCPCS | Performed by: INTERNAL MEDICINE

## 2025-09-04 PROCEDURE — 6360000002 HC RX W HCPCS

## 2025-09-04 RX ORDER — REGADENOSON 0.08 MG/ML
INJECTION, SOLUTION INTRAVENOUS
Status: COMPLETED
Start: 2025-09-04 | End: 2025-09-04

## 2025-09-04 RX ORDER — TETRAKIS(2-METHOXYISOBUTYLISOCYANIDE)COPPER(I) TETRAFLUOROBORATE 1 MG/ML
29.3 INJECTION, POWDER, LYOPHILIZED, FOR SOLUTION INTRAVENOUS
Status: COMPLETED | OUTPATIENT
Start: 2025-09-04 | End: 2025-09-04

## 2025-09-04 RX ORDER — HYDRALAZINE HYDROCHLORIDE 20 MG/ML
INJECTION INTRAMUSCULAR; INTRAVENOUS
Status: COMPLETED
Start: 2025-09-04 | End: 2025-09-04

## 2025-09-04 RX ORDER — TETRAKIS(2-METHOXYISOBUTYLISOCYANIDE)COPPER(I) TETRAFLUOROBORATE 1 MG/ML
9.05 INJECTION, POWDER, LYOPHILIZED, FOR SOLUTION INTRAVENOUS
Status: COMPLETED | OUTPATIENT
Start: 2025-09-04 | End: 2025-09-04

## 2025-09-04 RX ADMIN — TETRAKIS(2-METHOXYISOBUTYLISOCYANIDE)COPPER(I) TETRAFLUOROBORATE 9.05 MILLICURIE: 1 INJECTION, POWDER, LYOPHILIZED, FOR SOLUTION INTRAVENOUS at 10:50

## 2025-09-04 RX ADMIN — TETRAKIS(2-METHOXYISOBUTYLISOCYANIDE)COPPER(I) TETRAFLUOROBORATE 29.3 MILLICURIE: 1 INJECTION, POWDER, LYOPHILIZED, FOR SOLUTION INTRAVENOUS at 12:20

## 2025-09-04 RX ADMIN — REGADENOSON 0.4 MG: 0.08 INJECTION, SOLUTION INTRAVENOUS at 12:20

## 2025-09-04 RX ADMIN — HYDRALAZINE HYDROCHLORIDE 10 MG: 20 INJECTION, SOLUTION INTRAMUSCULAR; INTRAVENOUS at 12:25

## (undated) DEVICE — SUTURE PROL SZ 0 L30IN NONABSORBABLE BLU L26MM CT-2 1/2 CIR 8412H

## (undated) DEVICE — Device

## (undated) DEVICE — TRAP SPEC POLYP REM STRNR CLN DSGN MAGNIFYING WIND DISP

## (undated) DEVICE — TROCAR: Brand: KII® SLEEVE

## (undated) DEVICE — FORCEPS BX L240CM JAW DIA2.8MM L CAP W/ NDL MIC MESH TOOTH

## (undated) DEVICE — SUTURE VICRYL + SZ 0 L27IN ABSRB VLT L26MM UR-6 5/8 CIR VCP603H

## (undated) DEVICE — JELLY, LUBE, STERILE, FOIL PACK, 5G: Brand: MEDLINE

## (undated) DEVICE — ELECTRODE PT RET AD L9FT HI MOIST COND ADH HYDRGEL CORDED

## (undated) DEVICE — GLOVE SURG SZ 75 CRM LTX FREE POLYISOPRENE POLYMER BEAD ANTI

## (undated) DEVICE — TROCAR: Brand: KII FIOS FIRST ENTRY

## (undated) DEVICE — SUTURE VICRYL + SZ 3-0 L27IN ABSRB UD L26MM SH 1/2 CIR VCP416H

## (undated) DEVICE — SOLUTION IV 500ML 0.9% SOD CHL PH 5 INJ USP VIAFLX PLAS

## (undated) DEVICE — 3M™ TEGADERM™ TRANSPARENT FILM DRESSING FRAME STYLE, 1626W, 4 IN X 4-3/4 IN (10 CM X 12 CM), 50/CT 4CT/CASE: Brand: 3M™ TEGADERM™

## (undated) DEVICE — SUTURE MONOCRYL SZ 4-0 L27IN ABSRB UD L19MM PS-2 1/2 CIR PRIM Y426H

## (undated) DEVICE — DECANTER BAG 9": Brand: MEDLINE INDUSTRIES, INC.

## (undated) DEVICE — KIT COLON WITH 1.1 OZ ORCA HYDRA SEAL 2 GOWN ADAPT SECONDARY

## (undated) DEVICE — AEGIS 1" DISK 4MM HOLE, PEEL OPEN: Brand: MEDLINE

## (undated) DEVICE — TOWEL,OR,DSP,ST,BLUE,STD,4/PK,20PK/CS: Brand: MEDLINE

## (undated) DEVICE — GENERAL LAPAROSCOPY-MRMC: Brand: MEDLINE INDUSTRIES, INC.

## (undated) DEVICE — ADAPTER DLYS TI LOK ADLOK 2 PC

## (undated) DEVICE — ELECTRODE,RT,STRESS,FOAM,5PK: Brand: MEDLINE

## (undated) DEVICE — LINE SAMPLING ADVANCE ORAL NASAL MICROSTREAM O2 TUBING 6.5'

## (undated) DEVICE — SNARE POLYP SM W13MMXL240CM SHTH DIA2.4MM OVL FLX DISP

## (undated) DEVICE — SPONGE GZ W4XL4IN COT 12 PLY TYP VII WVN C FLD DSGN STERILE

## (undated) DEVICE — LAPAROSCOPIC TROCAR SLEEVE/SINGLE USE: Brand: KII® OPTICAL ACCESS SYSTEM

## (undated) DEVICE — LIQUIBAND RAPID ADHESIVE 36/CS 0.8ML: Brand: MEDLINE